# Patient Record
Sex: FEMALE | Race: ASIAN | NOT HISPANIC OR LATINO | URBAN - METROPOLITAN AREA
[De-identification: names, ages, dates, MRNs, and addresses within clinical notes are randomized per-mention and may not be internally consistent; named-entity substitution may affect disease eponyms.]

---

## 2019-05-01 ENCOUNTER — EMERGENCY (EMERGENCY)
Facility: HOSPITAL | Age: 59
LOS: 1 days | Discharge: HOME | End: 2019-05-01
Attending: EMERGENCY MEDICINE | Admitting: EMERGENCY MEDICINE
Payer: MEDICAID

## 2019-05-01 VITALS
TEMPERATURE: 98 F | DIASTOLIC BLOOD PRESSURE: 76 MMHG | HEART RATE: 99 BPM | RESPIRATION RATE: 18 BRPM | SYSTOLIC BLOOD PRESSURE: 127 MMHG | OXYGEN SATURATION: 100 %

## 2019-05-01 VITALS
HEART RATE: 86 BPM | DIASTOLIC BLOOD PRESSURE: 88 MMHG | TEMPERATURE: 98 F | OXYGEN SATURATION: 100 % | SYSTOLIC BLOOD PRESSURE: 124 MMHG | RESPIRATION RATE: 18 BRPM

## 2019-05-01 LAB
ALBUMIN SERPL ELPH-MCNC: 4.1 G/DL — SIGNIFICANT CHANGE UP (ref 3.3–5)
ALP SERPL-CCNC: 76 U/L — SIGNIFICANT CHANGE UP (ref 40–120)
ALT FLD-CCNC: 18 U/L — SIGNIFICANT CHANGE UP (ref 4–33)
ANION GAP SERPL CALC-SCNC: 14 MMO/L — SIGNIFICANT CHANGE UP (ref 7–14)
AST SERPL-CCNC: 18 U/L — SIGNIFICANT CHANGE UP (ref 4–32)
BASOPHILS # BLD AUTO: 0.02 K/UL — SIGNIFICANT CHANGE UP (ref 0–0.2)
BASOPHILS NFR BLD AUTO: 0.4 % — SIGNIFICANT CHANGE UP (ref 0–2)
BILIRUB SERPL-MCNC: < 0.2 MG/DL — LOW (ref 0.2–1.2)
BUN SERPL-MCNC: 10 MG/DL — SIGNIFICANT CHANGE UP (ref 7–23)
CALCIUM SERPL-MCNC: 9.5 MG/DL — SIGNIFICANT CHANGE UP (ref 8.4–10.5)
CHLORIDE SERPL-SCNC: 107 MMOL/L — SIGNIFICANT CHANGE UP (ref 98–107)
CO2 SERPL-SCNC: 24 MMOL/L — SIGNIFICANT CHANGE UP (ref 22–31)
CREAT SERPL-MCNC: 0.63 MG/DL — SIGNIFICANT CHANGE UP (ref 0.5–1.3)
EOSINOPHIL # BLD AUTO: 0.13 K/UL — SIGNIFICANT CHANGE UP (ref 0–0.5)
EOSINOPHIL NFR BLD AUTO: 2.6 % — SIGNIFICANT CHANGE UP (ref 0–6)
GIANT PLATELETS BLD QL SMEAR: PRESENT — SIGNIFICANT CHANGE UP
GLUCOSE SERPL-MCNC: 118 MG/DL — HIGH (ref 70–99)
HCT VFR BLD CALC: 39 % — SIGNIFICANT CHANGE UP (ref 34.5–45)
HGB BLD-MCNC: 12.3 G/DL — SIGNIFICANT CHANGE UP (ref 11.5–15.5)
IMM GRANULOCYTES NFR BLD AUTO: 0.4 % — SIGNIFICANT CHANGE UP (ref 0–1.5)
LIDOCAIN IGE QN: 48.1 U/L — SIGNIFICANT CHANGE UP (ref 7–60)
LYMPHOCYTES # BLD AUTO: 2.73 K/UL — SIGNIFICANT CHANGE UP (ref 1–3.3)
LYMPHOCYTES # BLD AUTO: 55.5 % — HIGH (ref 13–44)
MANUAL SMEAR VERIFICATION: SIGNIFICANT CHANGE UP
MCHC RBC-ENTMCNC: 27.3 PG — SIGNIFICANT CHANGE UP (ref 27–34)
MCHC RBC-ENTMCNC: 31.5 % — LOW (ref 32–36)
MCV RBC AUTO: 86.5 FL — SIGNIFICANT CHANGE UP (ref 80–100)
MONOCYTES # BLD AUTO: 0.24 K/UL — SIGNIFICANT CHANGE UP (ref 0–0.9)
MONOCYTES NFR BLD AUTO: 4.9 % — SIGNIFICANT CHANGE UP (ref 2–14)
NEUTROPHILS # BLD AUTO: 1.78 K/UL — LOW (ref 1.8–7.4)
NEUTROPHILS NFR BLD AUTO: 36.2 % — LOW (ref 43–77)
NRBC # FLD: 0 K/UL — SIGNIFICANT CHANGE UP (ref 0–0)
PLATELET # BLD AUTO: 92 K/UL — LOW (ref 150–400)
PLATELET COUNT - ESTIMATE: SIGNIFICANT CHANGE UP
PMV BLD: 12.3 FL — SIGNIFICANT CHANGE UP (ref 7–13)
POTASSIUM SERPL-MCNC: 4 MMOL/L — SIGNIFICANT CHANGE UP (ref 3.5–5.3)
POTASSIUM SERPL-SCNC: 4 MMOL/L — SIGNIFICANT CHANGE UP (ref 3.5–5.3)
PROT SERPL-MCNC: 7.4 G/DL — SIGNIFICANT CHANGE UP (ref 6–8.3)
RBC # BLD: 4.51 M/UL — SIGNIFICANT CHANGE UP (ref 3.8–5.2)
RBC # FLD: 17.2 % — HIGH (ref 10.3–14.5)
SODIUM SERPL-SCNC: 145 MMOL/L — SIGNIFICANT CHANGE UP (ref 135–145)
WBC # BLD: 4.92 K/UL — SIGNIFICANT CHANGE UP (ref 3.8–10.5)
WBC # FLD AUTO: 4.92 K/UL — SIGNIFICANT CHANGE UP (ref 3.8–10.5)

## 2019-05-01 PROCEDURE — 74177 CT ABD & PELVIS W/CONTRAST: CPT | Mod: 26

## 2019-05-01 PROCEDURE — 99284 EMERGENCY DEPT VISIT MOD MDM: CPT

## 2019-05-01 RX ORDER — SODIUM CHLORIDE 9 MG/ML
1000 INJECTION INTRAMUSCULAR; INTRAVENOUS; SUBCUTANEOUS ONCE
Qty: 0 | Refills: 0 | Status: COMPLETED | OUTPATIENT
Start: 2019-05-01 | End: 2019-05-01

## 2019-05-01 RX ADMIN — SODIUM CHLORIDE 1000 MILLILITER(S): 9 INJECTION INTRAMUSCULAR; INTRAVENOUS; SUBCUTANEOUS at 12:28

## 2019-05-01 NOTE — ED PROVIDER NOTE - OBJECTIVE STATEMENT
Patient had an out patient X-ray lumbar several days ago for back pain and was found to have dilated bowels; patient complained of mild left sided abdominal pain yesterday; no n/v/d, patient denies constipation, last bowel mvmt today; patient also complains of left sided neck pain x 11 days radiating to shoulder and back

## 2019-05-01 NOTE — ED ADULT NURSE NOTE - OBJECTIVE STATEMENT
pt reports approx 2 weeks of Left flank LUQ abd pain radiating to upper back with associated constipation. pt denies urinary symptoms denies vaginal symptoms but does report constipation. abd soft non tender. IV 22 g L forearm labs sent TQ removed NS infusing.

## 2019-05-01 NOTE — ED PROVIDER NOTE - CARE PLAN
Principal Discharge DX:	Cervical strain, acute, initial encounter  Secondary Diagnosis:	Abdominal pain of unknown cause

## 2019-05-01 NOTE — ED ADULT TRIAGE NOTE - CHIEF COMPLAINT QUOTE
Patient PMH SLE reports neck, back and abd pain for 11 days. Patient went to PMH office and had XR performed showing dilation in sigmoid colon and transverse colon. MD script recommends abdominal XR and CT of Abd/Pelvis for further evaluation. Patient currently denies any N/V, last BM today - reports was "a little bit hard" but normal amount.

## 2022-02-15 NOTE — ED ADULT TRIAGE NOTE - PAIN RATING/NUMBER SCALE (0-10): REST
well developed, well nourished , in no acute distress , ambulating without difficulty , normal communication ability
8

## 2023-02-14 NOTE — ED PROVIDER NOTE - NO SIGNIFICANT PAST SURGICAL HISTORY
-- DO NOT REPLY / DO NOT REPLY ALL --  -- Message is from Engagement Center Operations (ECO) --    General Patient Message:     The patients father is calling in regard to the patient needing the referral and order for Physical Therapy authorized and then faxed to [facility] at [phone] please call the patients father back in order to confirm the referral and order has been sent to [facility].    Caller Information       Type Contact Phone/Fax    02/14/2023 03:21 PM CST Phone (Incoming) Praneeth Andujar (Father) [phone]    02/14/2023 03:27 PM CST Phone (Incoming) KELLI ALANIS (Mother) [phone] (M)        Alternative phone number: None    Can a detailed message be left? Yes    Message Turnaround:     Is it Working Hours? Yes - Working Hours     [place]:    Please give this turnaround time to the caller:   \"This message will be sent to [state Provider's name]. The clinical team will fulfill your request as soon as they review your message.\"                
Pls place note and sign off so father can schedule pt . Thx  
Spoke with father. Dr to sign note and father can call back tomorrow to schedule. Father verbalizes understanding  
<<----- Click to add NO significant Past Surgical History

## 2024-06-03 ENCOUNTER — INPATIENT (INPATIENT)
Facility: HOSPITAL | Age: 64
LOS: 3 days | Discharge: HOME HEALTH | DRG: 690 | End: 2024-06-07
Attending: INTERNAL MEDICINE | Admitting: INTERNAL MEDICINE
Payer: COMMERCIAL

## 2024-06-03 VITALS
HEART RATE: 85 BPM | DIASTOLIC BLOOD PRESSURE: 64 MMHG | SYSTOLIC BLOOD PRESSURE: 120 MMHG | RESPIRATION RATE: 13 BRPM | TEMPERATURE: 99 F | OXYGEN SATURATION: 97 %

## 2024-06-03 DIAGNOSIS — N39.0 URINARY TRACT INFECTION, SITE NOT SPECIFIED: ICD-10-CM

## 2024-06-03 PROBLEM — M32.9 SYSTEMIC LUPUS ERYTHEMATOSUS, UNSPECIFIED: Chronic | Status: ACTIVE | Noted: 2019-05-01

## 2024-06-03 LAB
ALBUMIN SERPL ELPH-MCNC: 4 G/DL — SIGNIFICANT CHANGE UP (ref 3.3–5)
ALP SERPL-CCNC: 89 U/L — SIGNIFICANT CHANGE UP (ref 40–120)
ALT FLD-CCNC: 9 U/L — LOW (ref 10–45)
ANION GAP SERPL CALC-SCNC: 13 MMOL/L — SIGNIFICANT CHANGE UP (ref 5–17)
APPEARANCE UR: CLEAR — SIGNIFICANT CHANGE UP
AST SERPL-CCNC: 14 U/L — SIGNIFICANT CHANGE UP (ref 10–40)
BACTERIA # UR AUTO: NEGATIVE /HPF — SIGNIFICANT CHANGE UP
BASOPHILS # BLD AUTO: 0 K/UL — SIGNIFICANT CHANGE UP (ref 0–0.2)
BASOPHILS NFR BLD AUTO: 0 % — SIGNIFICANT CHANGE UP (ref 0–2)
BILIRUB SERPL-MCNC: 0.2 MG/DL — SIGNIFICANT CHANGE UP (ref 0.2–1.2)
BILIRUB UR-MCNC: NEGATIVE — SIGNIFICANT CHANGE UP
BUN SERPL-MCNC: 8 MG/DL — SIGNIFICANT CHANGE UP (ref 7–23)
CALCIUM SERPL-MCNC: 9.4 MG/DL — SIGNIFICANT CHANGE UP (ref 8.4–10.5)
CAST: 0 /LPF — SIGNIFICANT CHANGE UP (ref 0–4)
CHLORIDE SERPL-SCNC: 107 MMOL/L — SIGNIFICANT CHANGE UP (ref 96–108)
CO2 SERPL-SCNC: 22 MMOL/L — SIGNIFICANT CHANGE UP (ref 22–31)
COLOR SPEC: YELLOW — SIGNIFICANT CHANGE UP
CREAT SERPL-MCNC: 0.63 MG/DL — SIGNIFICANT CHANGE UP (ref 0.5–1.3)
DIFF PNL FLD: NEGATIVE — SIGNIFICANT CHANGE UP
EGFR: 98 ML/MIN/1.73M2 — SIGNIFICANT CHANGE UP
EOSINOPHIL # BLD AUTO: 0.13 K/UL — SIGNIFICANT CHANGE UP (ref 0–0.5)
EOSINOPHIL NFR BLD AUTO: 4.5 % — SIGNIFICANT CHANGE UP (ref 0–6)
GIANT PLATELETS BLD QL SMEAR: PRESENT — SIGNIFICANT CHANGE UP
GLUCOSE SERPL-MCNC: 105 MG/DL — HIGH (ref 70–99)
GLUCOSE UR QL: NEGATIVE MG/DL — SIGNIFICANT CHANGE UP
HCT VFR BLD CALC: 36.1 % — SIGNIFICANT CHANGE UP (ref 34.5–45)
HGB BLD-MCNC: 11.9 G/DL — SIGNIFICANT CHANGE UP (ref 11.5–15.5)
KETONES UR-MCNC: NEGATIVE MG/DL — SIGNIFICANT CHANGE UP
LEUKOCYTE ESTERASE UR-ACNC: ABNORMAL
LYMPHOCYTES # BLD AUTO: 1.6 K/UL — SIGNIFICANT CHANGE UP (ref 1–3.3)
LYMPHOCYTES # BLD AUTO: 53.6 % — HIGH (ref 13–44)
MANUAL SMEAR VERIFICATION: SIGNIFICANT CHANGE UP
MCHC RBC-ENTMCNC: 29.5 PG — SIGNIFICANT CHANGE UP (ref 27–34)
MCHC RBC-ENTMCNC: 33 GM/DL — SIGNIFICANT CHANGE UP (ref 32–36)
MCV RBC AUTO: 89.4 FL — SIGNIFICANT CHANGE UP (ref 80–100)
MONOCYTES # BLD AUTO: 0.18 K/UL — SIGNIFICANT CHANGE UP (ref 0–0.9)
MONOCYTES NFR BLD AUTO: 6.2 % — SIGNIFICANT CHANGE UP (ref 2–14)
NEUTROPHILS # BLD AUTO: 1.04 K/UL — LOW (ref 1.8–7.4)
NEUTROPHILS NFR BLD AUTO: 33.9 % — LOW (ref 43–77)
NEUTS BAND # BLD: 0.9 % — SIGNIFICANT CHANGE UP (ref 0–8)
NITRITE UR-MCNC: NEGATIVE — SIGNIFICANT CHANGE UP
PH UR: 6.5 — SIGNIFICANT CHANGE UP (ref 5–8)
PLAT MORPH BLD: NORMAL — SIGNIFICANT CHANGE UP
PLATELET # BLD AUTO: 81 K/UL — LOW (ref 150–400)
POTASSIUM SERPL-MCNC: 4.1 MMOL/L — SIGNIFICANT CHANGE UP (ref 3.5–5.3)
POTASSIUM SERPL-SCNC: 4.1 MMOL/L — SIGNIFICANT CHANGE UP (ref 3.5–5.3)
PROT SERPL-MCNC: 7.3 G/DL — SIGNIFICANT CHANGE UP (ref 6–8.3)
PROT UR-MCNC: NEGATIVE MG/DL — SIGNIFICANT CHANGE UP
RBC # BLD: 4.04 M/UL — SIGNIFICANT CHANGE UP (ref 3.8–5.2)
RBC # FLD: 13.2 % — SIGNIFICANT CHANGE UP (ref 10.3–14.5)
RBC BLD AUTO: NORMAL — SIGNIFICANT CHANGE UP
RBC CASTS # UR COMP ASSIST: 1 /HPF — SIGNIFICANT CHANGE UP (ref 0–4)
SODIUM SERPL-SCNC: 142 MMOL/L — SIGNIFICANT CHANGE UP (ref 135–145)
SP GR SPEC: >1.03 — HIGH (ref 1–1.03)
SQUAMOUS # UR AUTO: 1 /HPF — SIGNIFICANT CHANGE UP (ref 0–5)
UROBILINOGEN FLD QL: 0.2 MG/DL — SIGNIFICANT CHANGE UP (ref 0.2–1)
VARIANT LYMPHS # BLD: 0.9 % — SIGNIFICANT CHANGE UP (ref 0–6)
WBC # BLD: 2.98 K/UL — LOW (ref 3.8–10.5)
WBC # FLD AUTO: 2.98 K/UL — LOW (ref 3.8–10.5)
WBC UR QL: 27 /HPF — HIGH (ref 0–5)

## 2024-06-03 PROCEDURE — 99285 EMERGENCY DEPT VISIT HI MDM: CPT

## 2024-06-03 PROCEDURE — 74177 CT ABD & PELVIS W/CONTRAST: CPT | Mod: 26,MC

## 2024-06-03 PROCEDURE — 73564 X-RAY EXAM KNEE 4 OR MORE: CPT | Mod: 26,LT,RT

## 2024-06-03 RX ORDER — SULFASALAZINE 500 MG
1 TABLET ORAL
Refills: 0 | DISCHARGE

## 2024-06-03 RX ORDER — HYDROXYCHLOROQUINE SULFATE 200 MG
1 TABLET ORAL
Refills: 0 | DISCHARGE

## 2024-06-03 RX ORDER — CEFTRIAXONE 500 MG/1
1000 INJECTION, POWDER, FOR SOLUTION INTRAMUSCULAR; INTRAVENOUS ONCE
Refills: 0 | Status: COMPLETED | OUTPATIENT
Start: 2024-06-03 | End: 2024-06-03

## 2024-06-03 RX ORDER — KETOROLAC TROMETHAMINE 30 MG/ML
15 SYRINGE (ML) INJECTION ONCE
Refills: 0 | Status: DISCONTINUED | OUTPATIENT
Start: 2024-06-03 | End: 2024-06-03

## 2024-06-03 RX ORDER — HYDROXYCHLOROQUINE SULFATE 200 MG
200 TABLET ORAL
Refills: 0 | Status: DISCONTINUED | OUTPATIENT
Start: 2024-06-03 | End: 2024-06-07

## 2024-06-03 RX ORDER — UPADACITINIB 45 MG/1
1 TABLET, EXTENDED RELEASE ORAL
Refills: 0 | DISCHARGE

## 2024-06-03 RX ORDER — SULFASALAZINE 500 MG
500 TABLET ORAL
Refills: 0 | Status: DISCONTINUED | OUTPATIENT
Start: 2024-06-03 | End: 2024-06-07

## 2024-06-03 RX ORDER — SODIUM CHLORIDE 9 MG/ML
1000 INJECTION INTRAMUSCULAR; INTRAVENOUS; SUBCUTANEOUS ONCE
Refills: 0 | Status: COMPLETED | OUTPATIENT
Start: 2024-06-03 | End: 2024-06-03

## 2024-06-03 RX ORDER — ENOXAPARIN SODIUM 100 MG/ML
40 INJECTION SUBCUTANEOUS EVERY 24 HOURS
Refills: 0 | Status: DISCONTINUED | OUTPATIENT
Start: 2024-06-03 | End: 2024-06-07

## 2024-06-03 RX ORDER — ACETAMINOPHEN 500 MG
650 TABLET ORAL EVERY 6 HOURS
Refills: 0 | Status: DISCONTINUED | OUTPATIENT
Start: 2024-06-03 | End: 2024-06-07

## 2024-06-03 RX ORDER — ACETAMINOPHEN 500 MG
1000 TABLET ORAL ONCE
Refills: 0 | Status: COMPLETED | OUTPATIENT
Start: 2024-06-03 | End: 2024-06-03

## 2024-06-03 RX ADMIN — CEFTRIAXONE 100 MILLIGRAM(S): 500 INJECTION, POWDER, FOR SOLUTION INTRAMUSCULAR; INTRAVENOUS at 15:53

## 2024-06-03 RX ADMIN — SODIUM CHLORIDE 1000 MILLILITER(S): 9 INJECTION INTRAMUSCULAR; INTRAVENOUS; SUBCUTANEOUS at 13:39

## 2024-06-03 RX ADMIN — Medication 400 MILLIGRAM(S): at 12:08

## 2024-06-03 RX ADMIN — Medication 1000 MILLIGRAM(S): at 12:30

## 2024-06-03 RX ADMIN — Medication 15 MILLIGRAM(S): at 18:40

## 2024-06-03 NOTE — ED ADULT NURSE NOTE - NSFALLRISKINTERV_ED_ALL_ED

## 2024-06-03 NOTE — ED ADULT NURSE NOTE - OBJECTIVE STATEMENT
Pt is a 63 y/o female with pmh lupus and arthritis presenting to the ED c/o urinary symtpoms. Pt is primarily Armenian-speaking, her daughter present at bedside requesting to translate. Pt reports persistent burning on urination with increased frequency since 5/12, was treated for suspected UTI with PO abx (unable to recall specific abx taken.) Pt reports course of abx was finished 1 week ago, but s/s are not resolved, pt is also now having hematuria and L flank pain. Pt denies fever, chills, n/v, abdominal pain, chest pain, SOB. Pt is a 63 y/o female with pmh lupus and arthritis presenting to the ED c/o urinary symptoms. Pt is primarily Indonesian-speaking, her daughter present at bedside requesting to translate. Pt reports persistent burning on urination with increased frequency since 5/12, was treated for suspected UTI with PO abx (unable to recall specific abx taken.) Pt reports course of abx was finished 1 week ago, but s/s are not resolved, pt is also now having hematuria and L flank pain. Pt denies fever, chills, n/v, abdominal pain, chest pain, SOB. Pt also reports having increased knee pain and "clicking sounds" b/l worse on the L knee compared to R. Pt denies recent falls/trauma, states it does not feel like her typical arthritis pain.

## 2024-06-03 NOTE — H&P ADULT - NSHPPHYSICALEXAM_GEN_ALL_CORE
T(C): 36.7 (06-03-24 @ 18:45), Max: 37.1 (06-03-24 @ 10:23)  HR: 83 (06-03-24 @ 18:45) (83 - 88)  BP: 127/81 (06-03-24 @ 18:45) (108/63 - 127/81)  RR: 16 (06-03-24 @ 18:45) (13 - 16)  SpO2: 97% (06-03-24 @ 18:45) (97% - 100%)    PHYSICAL EXAM:  GENERAL: NAD, well-developed  HEAD:  Atraumatic, Normocephalic  EYES: EOMI, PERRLA, conjunctiva and sclera clear  NECK: Supple, No JVD  CHEST/LUNG: Clear to auscultation bilaterally; No wheeze  HEART: Regular rate and rhythm; No murmurs, rubs, or gallops  ABDOMEN: Soft, Nontender, Nondistended; Bowel sounds present  EXTREMITIES:  2+ Peripheral Pulses, No clubbing, cyanosis, or edema  PSYCH: AAOx3  NEUROLOGY: non-focal  SKIN: No rashes or lesions

## 2024-06-03 NOTE — ED ADULT NURSE NOTE - CHIEF COMPLAINT QUOTE
returned form [facility] with the left knee pain  she was there for 11 months both knee pain pt has UTI symptoms also finished antibiotics

## 2024-06-03 NOTE — ED PROVIDER NOTE - PHYSICAL EXAMINATION
GEN: NAD, awake, eyes open spontaneously  EYES: normal conjunctiva, perrl  ENT: NCAT, MMM, Trachea midline  CHEST/LUNGS: Non-tachypneic, CTAB, bilateral breath sounds  CARDIAC: Non-tachycardic, normal perfusion  ABDOMEN: Soft, LLQ tenderness to palpation, No rebound/guarding  : + Left CVA tenderness. No right sided CVA tenderness   MSK: Crepitus palpated over left patella with knee flexion. Pain with knee flexion bilaterally. No point tenderness to palpation. No overlying skin changes.   SKIN: No rashes, no petechiae, no vesicles  NEURO: CN grossly intact, normal coordination, no focal motor or sensory deficits  PSYCH: Alert, appropriate, cooperative, with capacity and insight

## 2024-06-03 NOTE — ED ADULT TRIAGE NOTE - CHIEF COMPLAINT QUOTE
returned form [facility] with the left knee pain  she was there for 11 months both knee pain pt has UTI symptoms also finished antibiotics

## 2024-06-03 NOTE — ED PROVIDER NOTE - CLINICAL SUMMARY MEDICAL DECISION MAKING FREE TEXT BOX
64F with hx of lupus presents with persistent dysuria, hematuria, frequency despite course of outpatient abx, now with abdominal and left flank pain. Concern for UTI vs pyelo. Will obtain urine studies as well as eval kidney function. Consider imaging studies if kidney function abnormal. Also here with bilateral knee pain, L>R. Suspect osteoarthritis - will obtain XRs, pain control and reassess. 64F with hx of lupus presents with persistent dysuria, hematuria, frequency despite course of outpatient abx, now with abdominal and left flank pain. Concern for UTI vs pyelo vs kidney stone. Will obtain urine studies, eval kidney function, and CT abd/pel. Also here with bilateral knee pain, L>R. Suspect osteoarthritis - will obtain XRs, pain control and reassess.

## 2024-06-03 NOTE — ED PROVIDER NOTE - PROGRESS NOTE DETAILS
Attending MD Franco.  Pt signed out to me in stable condition pending UA, 63 yo fem from [facility] who recently returned now s/p abxs 1 wk ago with recurrent dysuria/hematuria/planned tx anyway. Milvia Jones,  (PGY-1): Patient still with mild pain. Due to persistent symptoms despite OP therapy, will admit for monitoring and IV abx. Patient and daughter in agreement with plan. Will give Toradol for pain control. Milvia Jones,  (PGY-1): Patient still with mild pain. Due to persistent symptoms despite OP therapy, will admit for monitoring and IV abx. Patient and daughter in agreement with plan. Will give Toradol for pain control..

## 2024-06-03 NOTE — ED PROVIDER NOTE - ATTENDING APP SHARED VISIT CONTRIBUTION OF CARE
RGUJRAL 64-year-old female history of hypertension brought in by her daughter for symptoms of dysuria and hematuria that started on May 12.  Patient was taken to an urgent care and treated with ? name antibiotic that she finished about 1 week ago.  Patient did not feel better after taking the medication and now with persistent symptoms and left flank pain.  Denies any nausea vomiting diarrhea, fevers or chills.  Of note patient also complains of bilateral left greater than right knee pain for 6 months.  Pain is worse with ambulation and intermittent.  Denies any calf pain.  On exam patient is well-appearing no acute distress abdomen is soft mild tender suprapubic.  Left flank mild tender CVAT.  No rash.  Bilateral calfs nontender.  Bilateral knees no swelling mild pain elicited with range of motion.  Neurovascularly intact.  Will obtain labs CT urine to evaluate for pyelonephritis kidney stone.  Pain meds and continue to monitor.

## 2024-06-04 LAB
ADD ON TEST-SPECIMEN IN LAB: SIGNIFICANT CHANGE UP
CRP SERPL-MCNC: <3 MG/L — SIGNIFICANT CHANGE UP (ref 0–4)

## 2024-06-04 PROCEDURE — 72052 X-RAY EXAM NECK SPINE 6/>VWS: CPT | Mod: 26

## 2024-06-04 PROCEDURE — 99223 1ST HOSP IP/OBS HIGH 75: CPT

## 2024-06-04 PROCEDURE — 72100 X-RAY EXAM L-S SPINE 2/3 VWS: CPT | Mod: 26

## 2024-06-04 PROCEDURE — 73120 X-RAY EXAM OF HAND: CPT | Mod: 26,LT,RT

## 2024-06-04 RX ADMIN — Medication 650 MILLIGRAM(S): at 06:20

## 2024-06-04 RX ADMIN — Medication 200 MILLIGRAM(S): at 17:24

## 2024-06-04 RX ADMIN — Medication 650 MILLIGRAM(S): at 05:22

## 2024-06-04 RX ADMIN — Medication 200 MILLIGRAM(S): at 05:08

## 2024-06-04 RX ADMIN — Medication 500 MILLIGRAM(S): at 17:24

## 2024-06-04 RX ADMIN — Medication 500 MILLIGRAM(S): at 05:08

## 2024-06-04 NOTE — PATIENT PROFILE ADULT - FALL HARM RISK - HARM RISK INTERVENTIONS

## 2024-06-04 NOTE — CONSULT NOTE ADULT - SUBJECTIVE AND OBJECTIVE BOX
***consult has been received. note is in progress and incomplete without attending attestation***    Hubert Bernstein MD, PGY-5  Rheumatology Fellow  Reachable on TEAMS    NOÉDARIUS VALENCIA  [id number]    HPI:  64 year old Maori speaking female with hx of Lupus presents to the ED complaining of persistent dysuria, hematuria, urinary frequency x 3 weeks. She was evaluated on 5/12/24 for her symptoms and completed a 12 day course of antibiotics (unable to remember name). She states her symptoms did not improve with the antibiotics and she is now complaining of lower abdominal pain and left flank pain. Denies kidney issues in the past, hx of UTIs, or kidney stones. She denies nausea, vomiting, fever, chills. She is also endorsing bilateral knee pain for the past few months, L>R, with crepitus. She ambulates with a cane at baseline. No pain medications at home. No asymmetric swelling or redness. No trauma. (03 Jun 2024 21:40)    Rheumatology consulted to evaluate for SLE flare    Rheumatologist: Lauren Moss?      Rheum ROS    Denies fevers/chills/weight loss/night sweats/alopecia/sinus disease/asthma history/oral ulcers/dysphagia/vision changes/Raynauds/VTE/miscarraiges/sicca symptoms/urinary changes/edema/SOB/joint pain/swelling/jaw claudication/rash/photosensitivity/morning stiffness    Endorses fevers/chills/weight loss/night sweats/alopecia/sinus disease/asthma history/oral ulcers/dysphagia/vision changes/Raynauds/VTE/miscarraiges/sicca symptoms/urinary changes/edema/SOB/joint pain/swelling/jaw claudication/rash/photosensitivity/morning stiffness      MEDICATIONS  (STANDING):  enoxaparin Injectable 40 milliGRAM(s) SubCutaneous every 24 hours  hydroxychloroquine 200 milliGRAM(s) Oral two times a day  sulfaSALAzine 500 milliGRAM(s) Oral two times a day    MEDICATIONS  (PRN):  acetaminophen     Tablet .. 650 milliGRAM(s) Oral every 6 hours PRN Temp greater or equal to 38C (100.4F), Mild Pain (1 - 3)      Allergies    No Known Allergies    Intolerances        PERTINENT MEDICATION HISTORY:    SOCIAL HISTORY:  OCCUPATION:  TRAVEL HISTORY:    FAMILY HISTORY:  No pertinent family history in first degree relatives        Vital Signs Last 24 Hrs  T(C): 36.8 (04 Jun 2024 12:51), Max: 37 (03 Jun 2024 14:40)  T(F): 98.3 (04 Jun 2024 12:51), Max: 98.6 (03 Jun 2024 14:40)  HR: 69 (04 Jun 2024 12:51) (69 - 88)  BP: 121/81 (04 Jun 2024 12:51) (104/68 - 127/81)  BP(mean): --  RR: 18 (04 Jun 2024 12:51) (15 - 18)  SpO2: 97% (04 Jun 2024 12:51) (97% - 100%)    Parameters below as of 04 Jun 2024 12:51  Patient On (Oxygen Delivery Method): room air        Physical Exam:  General: No apparent distress  HEENT: EOMI, MMM  CVS: +S1/S2, RRR, no murmurs/rubs/gallops  Resp: CTA b/l. No crackles/wheezing  GI: Soft, NT/ND +BS  MSK: no swelling/warmth/erythema of the joints of the UE/LE  Neuro: AAOx3  Skin: no visible rashes    LABS:                        11.9   2.98  )-----------( 81       ( 03 Jun 2024 12:18 )             36.1     06-03    142  |  107  |  8   ----------------------------<  105<H>  4.1   |  22  |  0.63    Ca    9.4      03 Jun 2024 12:18    TPro  7.3  /  Alb  4.0  /  TBili  0.2  /  DBili  x   /  AST  14  /  ALT  9<L>  /  AlkPhos  89  06-03      Urinalysis Basic - ( 03 Jun 2024 14:59 )    Color: Yellow / Appearance: Clear / SG: >1.030 / pH: x  Gluc: x / Ketone: Negative mg/dL  / Bili: Negative / Urobili: 0.2 mg/dL   Blood: x / Protein: Negative mg/dL / Nitrite: Negative   Leuk Esterase: Small / RBC: 1 /HPF / WBC 27 /HPF   Sq Epi: x / Non Sq Epi: 1 /HPF / Bacteria: Negative /HPF        Rheumatology Work Up    C-Reactive Protein: <3 mg/L [0 - 4] (06-04-24 @ 11:37)    Urinalysis + Microscopic Examination (06.03.24 @ 14:59)    pH Urine: 6.5   Urine Appearance: Clear   Color: Yellow   Specific Gravity: >1.030   Protein, Urine: Negative mg/dL   Glucose Qualitative, Urine: Negative mg/dL   Ketone - Urine: Negative mg/dL   Blood, Urine: Negative   Bilirubin: Negative   Urobilinogen: 0.2 mg/dL   Leukocyte Esterase Concentration: Small   Nitrite: Negative   White Blood Cell - Urine: 27 /HPF   Red Blood Cell - Urine: 1 /HPF   Bacteria: Negative /HPF   Cast: 0 /LPF   Epithelial Cells: 1 /HPF        RADIOLOGY & ADDITIONAL STUDIES:  < from: CT Abdomen and Pelvis w/ IV Cont (06.03.24 @ 13:08) >    ACC: [id number] EXAM:  CT ABDOMEN AND PELVIS IC   ORDERED BY:  JOSE RAFAEL QUINTANILLA     PROCEDURE DATE:  06/03/2024          INTERPRETATION:  CLINICAL INFORMATION: uti, flank pain MCT    COMPARISON: None.    CONTRAST/COMPLICATIONS:  IV Contrast: Omnipaque 350  90 cc administered   10 cc discarded  Oral Contrast: NONE  Complications: None reported at time of study completion    PROCEDURE:  CT of the Abdomen and Pelvis was performed.  Sagittal and coronal reformats were performed.    FINDINGS:    LOWER CHEST: Within normal limits.    LIVER: Within normal limits.  BILE DUCTS: Normal caliber.  GALLBLADDER: Within normal limits.  SPLEEN: Enlarged.  PANCREAS: Within normal limits.  ADRENALS: Within normal limits.  KIDNEYS/URETERS: A 1 cm right upper pole angiomyolipoma. Small left upper   pole cysts.    BLADDER: Within normal limits.  REPRODUCTIVE ORGANS: Within normal limits.    BOWEL: No bowel obstruction. The appendix is normal.  PERITONEUM: No ascites.  VESSELS:  Within normal limits.  RETROPERITONEUM/LYMPH NODES: No lymphadenopathy.  ABDOMINAL WALL: Within normal limits.  BONES: Within normal limits.    IMPRESSION: No urinary calculus or obstructive uropathy.    Mild splenomegaly.    --- End of Report ---    < end of copied text >     Hubert Bernstein MD, PGY-5  Rheumatology Fellow  Reachable on TEAMS    WILLA SERNARICK  [id number]    HPI:  64 year old Slovenian speaking female with hx of Lupus presents to the ED complaining of persistent dysuria, hematuria, urinary frequency x 3 weeks. She was evaluated on 5/12/24 for her symptoms and completed a 12 day course of antibiotics (unable to remember name). She states her symptoms did not improve with the antibiotics and she is now complaining of lower abdominal pain and left flank pain. Denies kidney issues in the past, hx of UTIs, or kidney stones. She denies nausea, vomiting, fever, chills. She is also endorsing bilateral knee pain for the past few months, L>R, with crepitus. She ambulates with a cane at baseline. No pain medications at home. No asymmetric swelling or redness. No trauma. (03 Jun 2024 21:40)    Rheumatology consulted to evaluate for SLE flare    Rheumatologist: Lauren Moss  History gathered with assistance from daughter at bedside. Patient presenting for progressive b/l flank pain x3 weeks. Pain is constant with no association with food or urination. Has had courses of antibiotics without relief of pain. Patient came to ED and found to have UTI    Autoimmune history  Sees Dr. Lauren Moss  Diagnosed with SLE/RA approximately 10 years ago  Had symptoms of fever/arthralgias as well as neutropenia and thrombocytopenia  Has been on HCQ/SSZ (for many years) as well as on/off Rinvoq (2-3 years) and occasional steroids  Reports having bone marrow biopsy from outside Hematologist 2 years ago. Was told neutropenia was due to immunosuppression  Otherwise goes back and fourth to [facility]. Last blood work in [facility] from 4/2024 with negative dsdna/RF/ccp/C3/C4      Rheum ROS    Denies fevers/chills/weight loss/night sweats/alopecia/sinus disease/asthma history/oral ulcers/dysphagia/vision changes/Raynauds/VTE/urinary changes/SOB/rash/photosensitivity/morning stiffness    Endorses miscarraige x2 (2-3 months) /sicca symptoms/LE edema/joint pain      MEDICATIONS  (STANDING):  enoxaparin Injectable 40 milliGRAM(s) SubCutaneous every 24 hours  hydroxychloroquine 200 milliGRAM(s) Oral two times a day  sulfaSALAzine 500 milliGRAM(s) Oral two times a day    MEDICATIONS  (PRN):  acetaminophen     Tablet .. 650 milliGRAM(s) Oral every 6 hours PRN Temp greater or equal to 38C (100.4F), Mild Pain (1 - 3)      Allergies    No Known Allergies    Intolerances      PERTINENT MEDICATION HISTORY:    SOCIAL HISTORY: denies smoking/EtOH/drug use  OCCUPATION: not working  TRAVEL HISTORY: from [facility], comes back and fourth to the     FAMILY HISTORY:  No pertinent family history in first degree relatives      Vital Signs Last 24 Hrs  T(C): 36.8 (04 Jun 2024 12:51), Max: 37 (03 Jun 2024 14:40)  T(F): 98.3 (04 Jun 2024 12:51), Max: 98.6 (03 Jun 2024 14:40)  HR: 69 (04 Jun 2024 12:51) (69 - 88)  BP: 121/81 (04 Jun 2024 12:51) (104/68 - 127/81)  BP(mean): --  RR: 18 (04 Jun 2024 12:51) (15 - 18)  SpO2: 97% (04 Jun 2024 12:51) (97% - 100%)    Parameters below as of 04 Jun 2024 12:51  Patient On (Oxygen Delivery Method): room air        Physical Exam:  General: No apparent distress  HEENT: EOMI, MMM, no oral ulcers  CVS: +S1/S2, RRR, no murmurs/rubs/gallops  Resp: CTA b/l. No crackles/wheezing  MSK: no swelling/warmth/erythema of the joints of the UE/LE. radicular pain associated with ROM of the neck. pain with ROM of b/l shoulders however pain originating from neck. + SLR and PATRICIA in LLE  Neuro: AAOx3  Skin: no visible rashes    LABS:                        11.9   2.98  )-----------( 81       ( 03 Jun 2024 12:18 )             36.1     06-03    142  |  107  |  8   ----------------------------<  105<H>  4.1   |  22  |  0.63    Ca    9.4      03 Jun 2024 12:18    TPro  7.3  /  Alb  4.0  /  TBili  0.2  /  DBili  x   /  AST  14  /  ALT  9<L>  /  AlkPhos  89  06-03      Urinalysis Basic - ( 03 Jun 2024 14:59 )    Color: Yellow / Appearance: Clear / SG: >1.030 / pH: x  Gluc: x / Ketone: Negative mg/dL  / Bili: Negative / Urobili: 0.2 mg/dL   Blood: x / Protein: Negative mg/dL / Nitrite: Negative   Leuk Esterase: Small / RBC: 1 /HPF / WBC 27 /HPF   Sq Epi: x / Non Sq Epi: 1 /HPF / Bacteria: Negative /HPF        Rheumatology Work Up    C-Reactive Protein: <3 mg/L [0 - 4] (06-04-24 @ 11:37)    Urinalysis + Microscopic Examination (06.03.24 @ 14:59)    pH Urine: 6.5   Urine Appearance: Clear   Color: Yellow   Specific Gravity: >1.030   Protein, Urine: Negative mg/dL   Glucose Qualitative, Urine: Negative mg/dL   Ketone - Urine: Negative mg/dL   Blood, Urine: Negative   Bilirubin: Negative   Urobilinogen: 0.2 mg/dL   Leukocyte Esterase Concentration: Small   Nitrite: Negative   White Blood Cell - Urine: 27 /HPF   Red Blood Cell - Urine: 1 /HPF   Bacteria: Negative /HPF   Cast: 0 /LPF   Epithelial Cells: 1 /HPF    Culture Results:   >100,000 CFU/ml Escherichia coli (06.03.24 @ 14:59)      RADIOLOGY & ADDITIONAL STUDIES:  < from: CT Abdomen and Pelvis w/ IV Cont (06.03.24 @ 13:08) >    ACC: [id number] EXAM:  CT ABDOMEN AND PELVIS IC   ORDERED BY:  JOSE RAFAEL QUINTANILLA     PROCEDURE DATE:  06/03/2024          INTERPRETATION:  CLINICAL INFORMATION: uti, flank pain MCT    COMPARISON: None.    CONTRAST/COMPLICATIONS:  IV Contrast: Omnipaque 350  90 cc administered   10 cc discarded  Oral Contrast: NONE  Complications: None reported at time of study completion    PROCEDURE:  CT of the Abdomen and Pelvis was performed.  Sagittal and coronal reformats were performed.    FINDINGS:    LOWER CHEST: Within normal limits.    LIVER: Within normal limits.  BILE DUCTS: Normal caliber.  GALLBLADDER: Within normal limits.  SPLEEN: Enlarged.  PANCREAS: Within normal limits.  ADRENALS: Within normal limits.  KIDNEYS/URETERS: A 1 cm right upper pole angiomyolipoma. Small left upper   pole cysts.    BLADDER: Within normal limits.  REPRODUCTIVE ORGANS: Within normal limits.    BOWEL: No bowel obstruction. The appendix is normal.  PERITONEUM: No ascites.  VESSELS:  Within normal limits.  RETROPERITONEUM/LYMPH NODES: No lymphadenopathy.  ABDOMINAL WALL: Within normal limits.  BONES: Within normal limits.    IMPRESSION: No urinary calculus or obstructive uropathy.    Mild splenomegaly.    --- End of Report ---    < end of copied text >

## 2024-06-04 NOTE — CONSULT NOTE ADULT - ASSESSMENT
64 year old Japanese speaking female with hx of Lupus presents to the ED complaining of persistent dysuria, hematuria, urinary frequency x 3 weeks.     ##history of SLE?    ##neutropenia and thrombocytopenia 64 year old Korean speaking female with hx of Lupus presents to the ED complaining of persistent dysuria, hematuria, urinary frequency x 3 weeks.     ##history of SLE/RA  -follows with Dr. Lauren Moss  -takes HCQ/SSZ and Rinvoq  -given UTI, hold Rinvoq. can continue HCQ/SSZ  -last serologies from [facility] in 4/2024 with normal dsDNA/C3/C4/RF/CCP  -will check serologies here  -will assess hand xray to assess for stigmata of RA as none was seen on examination today    ##neutropenia and thrombocytopenia  -reports bone marrow biopsy 2 years ago negative for malignancy  -was told that was result of immunosuppression  -suspect component of SLE causing the bicytopenia  -also with mild splenomegaly ?LGL vs Felty syndrome if confirmed to have RA  -continue to monitor    ##arthralgia  -suspect component of OA  -neck pain and b/l UE pain likely due to cervical radiculopathy  -however given possible history of RA will assess for a-a instability  -if instability present would need neurosurgery input  -will check back xray for lumbar radiculopathy evaluation  -can trial gabapentin 300mg qHS to see if pain improves  -no active synovitis seen on exam, doubt inflammatory arthritis component    PLAN  -hold Rinvoq in setting of UTI. can continue SSZ and HCQ  -no need for further immunosuppression at this time  -f/u xrays  -no need to hold patient to follow up serologies sent today  -supportive pain measures, can trial gabapentin for neuropathic pain 300mg qHs for now    case d/w Dr. Tejada    the patient has emergency medicaid, will check if the patient can follow up at the [facility] fellows clinic located in [address] [place] [address] upon discharge as per patient preference; patient also has existing Rheumatologist Dr. Lauren Bernstein MD, PGY-5  Rheumatology Fellow  Reachable on TEAMS

## 2024-06-05 ENCOUNTER — TRANSCRIPTION ENCOUNTER (OUTPATIENT)
Age: 64
End: 2024-06-05

## 2024-06-05 LAB
ANTI-RIBONUCLEAR PROTEIN: <0.2 AI — SIGNIFICANT CHANGE UP
C3 SERPL-MCNC: 124 MG/DL — SIGNIFICANT CHANGE UP (ref 81–157)
C4 SERPL-MCNC: 15 MG/DL — SIGNIFICANT CHANGE UP (ref 13–39)
DSDNA AB FLD-ACNC: <0.2 AI — SIGNIFICANT CHANGE UP
DSDNA AB SER-ACNC: <1 IU/ML — SIGNIFICANT CHANGE UP
ENA SM AB FLD QL: <0.2 AI — SIGNIFICANT CHANGE UP
ENA SS-A AB FLD IA-ACNC: <0.2 AI — SIGNIFICANT CHANGE UP
ERYTHROCYTE [SEDIMENTATION RATE] IN BLOOD: 25 MM/HR — HIGH (ref 0–20)
RHEUMATOID FACT SERPL-ACNC: <10 IU/ML — SIGNIFICANT CHANGE UP (ref 0–13)

## 2024-06-05 PROCEDURE — 99222 1ST HOSP IP/OBS MODERATE 55: CPT

## 2024-06-05 RX ORDER — GABAPENTIN 400 MG/1
1 CAPSULE ORAL
Qty: 30 | Refills: 0
Start: 2024-06-05 | End: 2024-07-04

## 2024-06-05 RX ORDER — ERTAPENEM SODIUM 1 G/1
1000 INJECTION, POWDER, LYOPHILIZED, FOR SOLUTION INTRAMUSCULAR; INTRAVENOUS EVERY 24 HOURS
Refills: 0 | Status: COMPLETED | OUTPATIENT
Start: 2024-06-05 | End: 2024-06-07

## 2024-06-05 RX ORDER — ACETAMINOPHEN 500 MG
2 TABLET ORAL
Refills: 0 | DISCHARGE

## 2024-06-05 RX ORDER — ACETAMINOPHEN 500 MG
2 TABLET ORAL
Qty: 0 | Refills: 0 | DISCHARGE
Start: 2024-06-05

## 2024-06-05 RX ORDER — GABAPENTIN 400 MG/1
300 CAPSULE ORAL AT BEDTIME
Refills: 0 | Status: DISCONTINUED | OUTPATIENT
Start: 2024-06-05 | End: 2024-06-07

## 2024-06-05 RX ADMIN — GABAPENTIN 300 MILLIGRAM(S): 400 CAPSULE ORAL at 21:11

## 2024-06-05 RX ADMIN — Medication 200 MILLIGRAM(S): at 16:37

## 2024-06-05 RX ADMIN — Medication 500 MILLIGRAM(S): at 16:37

## 2024-06-05 RX ADMIN — Medication 650 MILLIGRAM(S): at 05:47

## 2024-06-05 RX ADMIN — Medication 200 MILLIGRAM(S): at 05:45

## 2024-06-05 RX ADMIN — Medication 650 MILLIGRAM(S): at 06:47

## 2024-06-05 RX ADMIN — ENOXAPARIN SODIUM 40 MILLIGRAM(S): 100 INJECTION SUBCUTANEOUS at 05:45

## 2024-06-05 RX ADMIN — ERTAPENEM SODIUM 120 MILLIGRAM(S): 1 INJECTION, POWDER, LYOPHILIZED, FOR SOLUTION INTRAMUSCULAR; INTRAVENOUS at 21:12

## 2024-06-05 RX ADMIN — Medication 500 MILLIGRAM(S): at 05:45

## 2024-06-05 NOTE — PHYSICAL THERAPY INITIAL EVALUATION ADULT - PLANNED THERAPY INTERVENTIONS, PT EVAL
GOAL: Patient will be able to negotiate 2 flights of steps in 2 weeks/gait training/transfer training

## 2024-06-05 NOTE — PHYSICAL THERAPY INITIAL EVALUATION ADULT - ADDITIONAL COMMENTS
Pt. is Bengali speaking, daughter at the bedside for translation. Patient lives with her family in a private house with 1 ERICK & 2 flights inside w/B/L handrails. As per pt.'s daughter, pt. is able to negotiate stairs independently; PTA, used SC for mobility. Pt. has family support at home and is never alone.

## 2024-06-05 NOTE — DISCHARGE NOTE PROVIDER - NSDCCPCAREPLAN_GEN_ALL_CORE_FT
PRINCIPAL DISCHARGE DIAGNOSIS  Diagnosis: Urinary tract infection  Assessment and Plan of Treatment: No further antibiotics needed  No sign of UTI nor PN on labs/imaging      SECONDARY DISCHARGE DIAGNOSES  Diagnosis: Lupus  Assessment and Plan of Treatment: Continue medications as prescribed   You can follow up at the [facility] fellows clinic located in [address] [place] [address] upon discharge or with existing Rheumatologist Dr. Lauren Moss

## 2024-06-05 NOTE — DISCHARGE NOTE PROVIDER - NSDCMRMEDTOKEN_GEN_ALL_CORE_FT
acetaminophen 325 mg oral tablet: 2 tab(s) orally every 6 hours As needed Temp greater or equal to 38C (100.4F), Mild Pain (1 - 3)  hydroxychloroquine 200 mg oral tablet: 1 tab(s) orally 2 times a day  Rinvoq 15 mg oral tablet, extended release: 1 tab(s) orally once a day  sulfaSALAzine 500 mg oral tablet: 1 tab(s) orally 2 times a day   1 rolling walker: as directed  / height 160.0cm / weight 81.50kg  acetaminophen 325 mg oral tablet: 2 tab(s) orally every 6 hours As needed Temp greater or equal to 38C (100.4F), Mild Pain (1 - 3)  ertapenem 1 g injection: 1 gram(s) intravenously once a day as directed  / esbl - ecoli / through 6/11 x 3 more doses per ID - pt. rec&#x27;d 6/7 dose before discharge  hydroxychloroquine 200 mg oral tablet: 1 tab(s) orally 2 times a day  Rinvoq 15 mg oral tablet, extended release: 1 tab(s) orally once a day  sulfaSALAzine 500 mg oral tablet: 1 tab(s) orally 2 times a day

## 2024-06-05 NOTE — DISCHARGE NOTE PROVIDER - HOSPITAL COURSE
HPI:  64 year old Luxembourgish speaking female with hx of Lupus presents to the ED complaining of persistent dysuria, hematuria, urinary frequency x 3 weeks. She was evaluated on 5/12/24 for her symptoms and completed a 12 day course of antibiotics (unable to remember name). She states her symptoms did not improve with the antibiotics and she is now complaining of lower abdominal pain and left flank pain. Denies kidney issues in the past, hx of UTIs, or kidney stones. She denies nausea, vomiting, fever, chills. She is also endorsing bilateral knee pain for the past few months, L>R, with crepitus. She ambulates with a cane at baseline. No pain medications at home. No asymmetric swelling or redness. No trauma. (03 Jun 2024 21:40)    Hospital Course:  ptn received CTX in the ED. no sign of UTI nor PN on labs/imaging  - cont IVF, analgesics    #history of SLE/RA  -follows with Dr. Lauren Moss  -takes HCQ/SSZ and Rinvoq  -last serologies from [facility] in 4/2024 with normal dsDNA/C3/C4/RF/CCP  -will check serologies here, outpt f/u    ##neutropenia and thrombocytopenia  -reports bone marrow biopsy 2 years ago negative for malignancy  -was told that was result of immunosuppression  -suspect component of SLE causing the bicytopenia  -also with mild splenomegaly ?LGL vs Felty syndrome if confirmed to have RA    -supportive pain measures, can trial gabapentin for neuropathic pain 300mg qHs for now    Important Medication Changes and Reason:    Active or Pending Issues Requiring Follow-up:    Advanced Directives:   [x ] Full code  [ ] DNR  [ ] Hospice    Discharge Diagnoses:         HPI:  64 year old Malay speaking female with hx of Lupus presents to the ED complaining of persistent dysuria, hematuria, urinary frequency x 3 weeks. She was evaluated on 5/12/24 for her symptoms and completed a 12 day course of antibiotics (unable to remember name). She states her symptoms did not improve with the antibiotics and she is now complaining of lower abdominal pain and left flank pain. Denies kidney issues in the past, hx of UTIs, or kidney stones. She denies nausea, vomiting, fever, chills. She is also endorsing bilateral knee pain for the past few months, L>R, with crepitus. She ambulates with a cane at baseline. No pain medications at home. No asymmetric swelling or redness. No trauma. (03 Jun 2024 21:40)    Hospital Course: ptn received CTX in the ED. no sign of UTI nor PN on labs/imaging  - cont IVF, analgesics    #history of SLE/RA  -follows with Dr. Lauren Moss  -takes HCQ/SSZ and Rinvoq  -last serologies from [facility] in 4/2024 with normal dsDNA/C3/C4/RF/CCP  -will check serologies here, outpt f/u    ##neutropenia and thrombocytopenia  -reports bone marrow biopsy 2 years ago negative for malignancy  -was told that was result of immunosuppression  -suspect component of SLE causing the bicytopenia  -also with mild splenomegaly ?LGL vs Felty syndrome if confirmed to have RA    -supportive pain measures, can trial gabapentin for neuropathic pain 300mg qHs for now    Important Medication Changes and Reason: Ertapenem IV per ID recs. through 6/11      Active or Pending Issues Requiring Follow-up: Followup with PCP  / Discharge d/w Dr. Portillo in detail      Advanced Directives:   [x ] Full code  [ ] DNR  [ ] Hospice    Discharge Diagnoses:  UTI   SLE / RA  Neutropenia / Thrombocytopenia

## 2024-06-05 NOTE — CONSULT NOTE ADULT - ASSESSMENT
Impression/Hospital Course:  64 year old Slovenian speaking female with hx of Lupus presents to the ED complaining of persistent dysuria, hematuria, urinary frequency x 3 weeks. She was evaluated on 5/12/24 for her symptoms and was given trimethoprim-sulfamethoxazole from 5/15 - 5/25 which didn't improve her symptoms. Currently she still admits to lower abdominal pain that radiates to her R flank and side but denies having any fevers at this time. Denies kidney issues in the past, hx of UTIs, or kidney stones. She denies nausea, vomiting, fever, chills. She states that her outpatient doctor didn't take any urine cultures. Since being in the hospital her symptoms haven't improved and she continued to have urinary frequency, pain with urination and the pain in her stomach and side. Daughter present at bedside and helped with translation    Antimicrobials:  Ceftriaxone 6/3    Assessment:  *UTI with UA showing small leukocytosis, 27 WBC and negative bacteria with urine culture growing E.coli pending sensitives. Previously completed course of trimethoprim-sulfamethoxazole likely pointing towards bacterial resistance  *Leukopenia with lymphocytic predominance  *Thrombocytopenia   *Lupus    *ESR 25    Recommendations:   - Start Ertapenem 1g IV QD as patient is likely to be colonized with resistant bacteria considering lack of improvement on trimethoprim-sulfamethoxazole  - trend temperature, ESR/CRP and WBC/Lymphocytes curve   - follow urine cultures (received by lab with results pending), adjust antimicrobial therapy based off of culture and sensitivity     Slade Josue DO, PGY-4   Infectious Disease Fellow  [employer] Teams Preferred  After 5pm/weekends call [phone]

## 2024-06-05 NOTE — DISCHARGE NOTE PROVIDER - CARE PROVIDER_API CALL
Lauren Moss  Rheumatology  [address] [place] [address]  Phone: [phone]  Fax: [phone]  Follow Up Time:

## 2024-06-05 NOTE — CONSULT NOTE ADULT - ATTENDING COMMENTS
64 year old Croatian speaking female with hx of Lupus presents to the ED complaining of persistent dysuria, hematuria, urinary frequency x 3 weeks. She was evaluated on 5/12/24 for her symptoms and was given trimethoprim-sulfamethoxazole from 5/15 - 5/25 which didn't improve her symptoms. Currently she still admits to lower abdominal pain that radiates to her R flank and side but denies having any fevers at this time. Denies kidney issues in the past, hx of UTIs, or kidney stones. She denies nausea, vomiting, fever, chills. She states that her outpatient doctor didn't take any urine cultures. Since being in the hospital her symptoms haven't improved and she continued to have urinary frequency, pain with urination and the pain in her stomach and side. Daughter present at bedside and helped with translation    Antimicrobials:  Ceftriaxone 6/3    Assessment:  *UTI with UA showing small leukocytosis, 27 WBC and negative bacteria with urine culture growing E.coli pending sensitives. Previously completed course of trimethoprim-sulfamethoxazole likely pointing towards bacterial resistance  *Leukopenia with lymphocytic predominance  *Thrombocytopenia   *Lupus    *ESR 25  immunosuppressed state.     Recommendations:   - Start Ertapenem 1g IV QD empirically   - trend WBC/Lymphocytes curve   - follow urine cultures (received by lab with results pending), adjust antimicrobial therapy based off of culture and sensitivity   - check blood cx if spikes.       Plan discussed with consulting team.     Lowell Fuentse  Please contact through MS Teams   If no response or past 5 pm/weekend call [phone].
pt seen with daughter at bedside just briefly before leaving for imaging  Acute admit is for UTI  We will complete full assessment as outpatient and follow up thereafter  daughter is a patient at our [facility] Rheumatology clinic and plans to bring the patient there   recall with questions via teams

## 2024-06-05 NOTE — CONSULT NOTE ADULT - SUBJECTIVE AND OBJECTIVE BOX
Patient is a 64y old  Female who presents with a chief complaint of urinary frequency  HPI:  64 year old Malay speaking female with hx of Lupus presents to the ED complaining of persistent dysuria, hematuria, urinary frequency x 3 weeks. She was evaluated on 5/12/24 for her symptoms and was given trimethoprim-sulfamethoxazole from 5/15 - 5/25 which didn't improve her symptoms. Currently she still admits to lower abdominal pain that radiates to her R flank and side but denies having any fevers at this time. Denies kidney issues in the past, hx of UTIs, or kidney stones. She denies nausea, vomiting, fever, chills. She states that her outpatient doctor didn't take any urine cultures. Since being in the hospital her symptoms haven't improved and she continued to have urinary frequency, pain with urination and the pain in her stomach and side. Daughter present at bedside and helped with translation.    REVIEW OF SYSTEMS  Constitutional: No fevers, No chills, No weight loss, No fatigue   Skin: No rash, no phlebitis	  Eyes: No discharge, No change in vision	  ENMT: No sore throat, No ulcers  Respiratory: No cough, no SOB  Cardiovascular:  No chest pain, No palpitations   Gastrointestinal: No pain, No nausea, No vomiting, No diarrhea, No constipation	  Genitourinary: Positive dysuria and frequency, No hesitancy, Positive flank pain  MSK: No Joint pain, No back pain, No edema  Neurological: No HA, no weakness, no seizures, no AMS     prior hospital charts reviewed [V]  primary team notes reviewed [V]  other consultant notes reviewed [V]    PAST MEDICAL & SURGICAL HISTORY:  Systemic lupus erythematosus      Lupus      No significant past surgical history          SOCIAL HISTORY:  Denied smoking/vaping/alcohol/recreational drug use, no sick contacts    FAMILY HISTORY:  No pertinent family history in first degree relatives        Allergies  No Known Allergies        ANTIMICROBIALS:  hydroxychloroquine 200 two times a day      ANTIMICROBIALS (past 90 days):  MEDICATIONS  (STANDING):  cefTRIAXone   IVPB   100 mL/Hr IV Intermittent (06-03-24 @ 15:53)    hydroxychloroquine   200 milliGRAM(s) Oral (06-05-24 @ 16:37)   200 milliGRAM(s) Oral (06-05-24 @ 05:45)   200 milliGRAM(s) Oral (06-04-24 @ 17:24)   200 milliGRAM(s) Oral (06-04-24 @ 05:08)        OTHER MEDS:   MEDICATIONS  (STANDING):  acetaminophen     Tablet .. 650 every 6 hours PRN  enoxaparin Injectable 40 every 24 hours  gabapentin 300 at bedtime  sulfaSALAzine 500 two times a day      VITALS:  Vital Signs Last 24 Hrs  T(F): 98.3 (06-05-24 @ 12:26), Max: 98.7 (06-03-24 @ 10:23)    Vital Signs Last 24 Hrs  HR: 67 (06-05-24 @ 12:26) (63 - 77)  BP: 124/77 (06-05-24 @ 12:26) (110/85 - 124/81)  RR: 18 (06-05-24 @ 12:26)  SpO2: 96% (06-05-24 @ 12:26) (96% - 98%)  Wt(kg): --    EXAM:  General: Patient appears comfortable, no acute distress  HEENT: NCAT, PERRL, anicteric sclera, mucous membranes moist and intact  Neck: Supple, No lymphadenopathy  CV: +S1/S2, RRR, no M/R/G  Lungs: No respiratory distress, CTA b/l, no wheezing, rales or rhonchi  Abd:  BS4+, Soft, NTND, no guarding  : positive suprapubic tenderness, slight tenderness in R flank  Neuro: AAOx3. No focal deficits noted.   Ext: No cyanosis, no edema  Msk: freely moving upper and lower extremities  Skin: No rash, no phlebitis, No erythema     WBC Trend:  WBC Count: 2.98 (06-03-24 @ 12:18)      Auto Neutrophil #: 1.04 K/uL (06-03-24 @ 12:18)  Band Neutrophils %: 0.9 % (06-03-24 @ 12:18)      Creatine Trend:  Creatinine: 0.63 (06-03)      Liver Biochemical Testing Trend:  Alanine Aminotransferase (ALT/SGPT): 9 *L* (06-03)  Aspartate Aminotransferase (AST/SGOT): 14 (06-03-24 @ 12:18)  Bilirubin Total: 0.2 (06-03)    Auto Eosinophil %: 4.5 % (06-03-24 @ 12:18)    MICROBIOLOGY:  Culture - Urine (collected 03 Jun 2024 14:59)  Source: Clean Catch Clean Catch (Midstream)  Preliminary Report:    >100,000 CFU/ml Escherichia coli    C-Reactive Protein: <3 (06-04)    RADIOLOGY:  < from: Xray Hand 2 Views, Bilateral (06.04.24 @ 17:49) >  IMPRESSION:    Left hand: No evidence of osseous erosion or destruction. There is   negative ulnar variance with mild right distal radial ulnar joint   arthrosis. Remaining joint spaces appear grossly preserved. No evidence   of acute fracture or dislocation.    Right hand: No evidence of osseous erosion. Mild basilar joint arthrosis.   Remaining joint spaces are preserved. No evidence of acute fracture or   dislocation.      < from: Xray Lumbar Spine AP + Lateral (06.04.24 @ 17:48) >  IMPRESSION:    There is mildlevoscoliosis lumbar spine. There is trace left lateral   listhesis of L2 on L3. Lumbar lordosis is maintained. Vertebral body   heights and disc heights are preserved.    < from: CT Abdomen and Pelvis w/ IV Cont (06.03.24 @ 13:08) >  IMPRESSION: No urinary calculus or obstructive uropathy.    Mild splenomegaly.       Patient is a 64y old  Female who presents with a chief complaint of urinary frequency  HPI:  64 year old Bulgarian speaking female with hx of Lupus presents to the ED complaining of persistent dysuria, hematuria, urinary frequency x 3 weeks. She was evaluated on 5/12/24 for her symptoms and was given trimethoprim-sulfamethoxazole from 5/15 - 5/25 which didn't improve her symptoms. Currently she still admits to lower abdominal pain that radiates to her R flank and side but denies having any fevers at this time. Denies kidney issues in the past, hx of UTIs, or kidney stones. She denies nausea, vomiting, fever, chills. She states that her outpatient doctor didn't take any urine cultures. Since being in the hospital her symptoms haven't improved and she continued to have urinary frequency, pain with urination and the pain in her stomach and side. Daughter present at bedside and helped with translation.    REVIEW OF SYSTEMS  Constitutional: No fevers, No chills,     Skin: No rash, no phlebitis	  Eyes: No discharge, No change in vision	  ENMT: No sore throat, No ulcers  Respiratory: No cough, no SOB  Cardiovascular:  No chest pain, No palpitations   Gastrointestinal: No pain, No nausea, No vomiting,  Genitourinary: Positive dysuria and frequency, No hesitancy, Positive flank pain  MSK: No Joint pain, No back pain, No edema  Neurological: No HA, no AMS   Extremities: No edema      prior hospital charts reviewed [V]  primary team notes reviewed [V]  other consultant notes reviewed [V]    PAST MEDICAL & SURGICAL HISTORY:  Systemic lupus erythematosus      Lupus      No significant past surgical history          SOCIAL HISTORY:  Denied smoking, lives with family.       FAMILY HISTORY:  No pertinent family history of lupus in first degree relatives        Allergies  No Known Allergies        ANTIMICROBIALS:  hydroxychloroquine 200 two times a day      ANTIMICROBIALS (past 90 days):  MEDICATIONS  (STANDING):  cefTRIAXone   IVPB   100 mL/Hr IV Intermittent (06-03-24 @ 15:53)    hydroxychloroquine   200 milliGRAM(s) Oral (06-05-24 @ 16:37)   200 milliGRAM(s) Oral (06-05-24 @ 05:45)   200 milliGRAM(s) Oral (06-04-24 @ 17:24)   200 milliGRAM(s) Oral (06-04-24 @ 05:08)        OTHER MEDS:   MEDICATIONS  (STANDING):  acetaminophen     Tablet .. 650 every 6 hours PRN  enoxaparin Injectable 40 every 24 hours  gabapentin 300 at bedtime  sulfaSALAzine 500 two times a day      VITALS:  Vital Signs Last 24 Hrs  T(F): 98.3 (06-05-24 @ 12:26), Max: 98.7 (06-03-24 @ 10:23)    Vital Signs Last 24 Hrs  HR: 67 (06-05-24 @ 12:26) (63 - 77)  BP: 124/77 (06-05-24 @ 12:26) (110/85 - 124/81)  RR: 18 (06-05-24 @ 12:26)  SpO2: 96% (06-05-24 @ 12:26) (96% - 98%)  Wt(kg): --    EXAM:  General: Patient appears comfortable, no acute distress  HEENT: anicteric sclera, mucous membranes moist and intact  Neck: Supple,   CV: +S1/S2, RRR, no M/R/G  Lungs: No respiratory distress, CTA b/l,  Abd:  BS4+, Soft, NTND  : positive suprapubic tenderness, slight tenderness in R flank  Neuro: AAOx3. No focal deficits noted.   Ext: No edema  Msk: no joint swelling   Skin: No rash, no phlebitis, No erythema       WBC Trend:  WBC Count: 2.98 (06-03-24 @ 12:18)      Auto Neutrophil #: 1.04 K/uL (06-03-24 @ 12:18)  Band Neutrophils %: 0.9 % (06-03-24 @ 12:18)      Creatine Trend:  Creatinine: 0.63 (06-03)      Liver Biochemical Testing Trend:  Alanine Aminotransferase (ALT/SGPT): 9 *L* (06-03)  Aspartate Aminotransferase (AST/SGOT): 14 (06-03-24 @ 12:18)  Bilirubin Total: 0.2 (06-03)    Auto Eosinophil %: 4.5 % (06-03-24 @ 12:18)    MICROBIOLOGY:  Culture - Urine (collected 03 Jun 2024 14:59)  Source: Clean Catch Clean Catch (Midstream)  Preliminary Report:    >100,000 CFU/ml Escherichia coli    C-Reactive Protein: <3 (06-04)    RADIOLOGY: Imaging reviewed personally and interpretation as mentioned below.       < from: Xray Hand 2 Views, Bilateral (06.04.24 @ 17:49) >  IMPRESSION:    Left hand: No evidence of osseous erosion or destruction. There is   negative ulnar variance with mild right distal radial ulnar joint   arthrosis. Remaining joint spaces appear grossly preserved. No evidence   of acute fracture or dislocation.    Right hand: No evidence of osseous erosion. Mild basilar joint arthrosis.   Remaining joint spaces are preserved. No evidence of acute fracture or   dislocation.      < from: Xray Lumbar Spine AP + Lateral (06.04.24 @ 17:48) >  IMPRESSION:    There is mildlevoscoliosis lumbar spine. There is trace left lateral   listhesis of L2 on L3. Lumbar lordosis is maintained. Vertebral body   heights and disc heights are preserved.    < from: CT Abdomen and Pelvis w/ IV Cont (06.03.24 @ 13:08) >  IMPRESSION: No urinary calculus or obstructive uropathy.    Mild splenomegaly.

## 2024-06-05 NOTE — PHYSICAL THERAPY INITIAL EVALUATION ADULT - NSPTDMEREC_GEN_A_CORE
Patient will require a rolling walker at home due to their Dx of gait instability 2/2 pain & impaired balance to help complete MRADL's. (Mobility related Activity for daily living)./rolling walker

## 2024-06-05 NOTE — PHYSICAL THERAPY INITIAL EVALUATION ADULT - PERTINENT HX OF CURRENT PROBLEM, REHAB EVAL
64 year old Kinyarwanda speaking female with hx of Lupus presents to the ED complaining of persistent dysuria, hematuria, urinary frequency x 3 weeks. She was evaluated on 5/12/24 for her symptoms and completed a 12 day course of antibiotics (unable to remember name). She states her symptoms did not improve with the antibiotics and she is now complaining of lower abdominal pain and left flank pain. Denies kidney issues in the past, hx of UTIs, or kidney stones. She denies nausea, vomiting, fever, chills. She is also endorsing bilateral knee pain for the past few months, L>R, with crepitus. She ambulates with a cane at baseline. No pain medications at home. No asymmetric swelling or redness. No trauma. Hospital course: (6/3) X-ray BL knee: Early osteoarthritis. (6/3) CT Abd/Pelvis: No urinary calculus or obstructive uropathy.  Mild splenomegaly.

## 2024-06-06 LAB
-  AMPICILLIN/SULBACTAM: SIGNIFICANT CHANGE UP
-  AMPICILLIN: SIGNIFICANT CHANGE UP
-  AZTREONAM: SIGNIFICANT CHANGE UP
-  CEFAZOLIN: SIGNIFICANT CHANGE UP
-  CEFEPIME: SIGNIFICANT CHANGE UP
-  CEFTRIAXONE: SIGNIFICANT CHANGE UP
-  CEFUROXIME: SIGNIFICANT CHANGE UP
-  CIPROFLOXACIN: SIGNIFICANT CHANGE UP
-  ERTAPENEM: SIGNIFICANT CHANGE UP
-  GENTAMICIN: SIGNIFICANT CHANGE UP
-  IMIPENEM: SIGNIFICANT CHANGE UP
-  LEVOFLOXACIN: SIGNIFICANT CHANGE UP
-  MEROPENEM: SIGNIFICANT CHANGE UP
-  NITROFURANTOIN: SIGNIFICANT CHANGE UP
-  PIPERACILLIN/TAZOBACTAM: SIGNIFICANT CHANGE UP
-  TOBRAMYCIN: SIGNIFICANT CHANGE UP
-  TRIMETHOPRIM/SULFAMETHOXAZOLE: SIGNIFICANT CHANGE UP
ANION GAP SERPL CALC-SCNC: 13 MMOL/L — SIGNIFICANT CHANGE UP (ref 5–17)
BUN SERPL-MCNC: 10 MG/DL — SIGNIFICANT CHANGE UP (ref 7–23)
CALCIUM SERPL-MCNC: 9.2 MG/DL — SIGNIFICANT CHANGE UP (ref 8.4–10.5)
CCP IGG SERPL-ACNC: <8 UNITS — SIGNIFICANT CHANGE UP
CHLORIDE SERPL-SCNC: 108 MMOL/L — SIGNIFICANT CHANGE UP (ref 96–108)
CO2 SERPL-SCNC: 22 MMOL/L — SIGNIFICANT CHANGE UP (ref 22–31)
CREAT ?TM UR-MCNC: 51 MG/DL — SIGNIFICANT CHANGE UP
CREAT SERPL-MCNC: 0.61 MG/DL — SIGNIFICANT CHANGE UP (ref 0.5–1.3)
CRP SERPL-MCNC: <3 MG/L — SIGNIFICANT CHANGE UP (ref 0–4)
CULTURE RESULTS: ABNORMAL
EGFR: 99 ML/MIN/1.73M2 — SIGNIFICANT CHANGE UP
ERYTHROCYTE [SEDIMENTATION RATE] IN BLOOD: 30 MM/HR — HIGH (ref 0–20)
GLUCOSE SERPL-MCNC: 95 MG/DL — SIGNIFICANT CHANGE UP (ref 70–99)
HCT VFR BLD CALC: 35 % — SIGNIFICANT CHANGE UP (ref 34.5–45)
HGB BLD-MCNC: 11.7 G/DL — SIGNIFICANT CHANGE UP (ref 11.5–15.5)
MCHC RBC-ENTMCNC: 29.8 PG — SIGNIFICANT CHANGE UP (ref 27–34)
MCHC RBC-ENTMCNC: 33.4 GM/DL — SIGNIFICANT CHANGE UP (ref 32–36)
MCV RBC AUTO: 89.3 FL — SIGNIFICANT CHANGE UP (ref 80–100)
METHOD TYPE: SIGNIFICANT CHANGE UP
NRBC # BLD: 0 /100 WBCS — SIGNIFICANT CHANGE UP (ref 0–0)
ORGANISM # SPEC MICROSCOPIC CNT: ABNORMAL
ORGANISM # SPEC MICROSCOPIC CNT: ABNORMAL
PLATELET # BLD AUTO: 53 K/UL — LOW (ref 150–400)
POTASSIUM SERPL-MCNC: 3.6 MMOL/L — SIGNIFICANT CHANGE UP (ref 3.5–5.3)
POTASSIUM SERPL-SCNC: 3.6 MMOL/L — SIGNIFICANT CHANGE UP (ref 3.5–5.3)
PROT ?TM UR-MCNC: 8 MG/DL — SIGNIFICANT CHANGE UP (ref 0–12)
PROT/CREAT UR-RTO: 0.2 RATIO — SIGNIFICANT CHANGE UP (ref 0–0.2)
RBC # BLD: 3.92 M/UL — SIGNIFICANT CHANGE UP (ref 3.8–5.2)
RBC # FLD: 13.3 % — SIGNIFICANT CHANGE UP (ref 10.3–14.5)
RF+CCP IGG SER-IMP: NEGATIVE — SIGNIFICANT CHANGE UP
SODIUM SERPL-SCNC: 143 MMOL/L — SIGNIFICANT CHANGE UP (ref 135–145)
SPECIMEN SOURCE: SIGNIFICANT CHANGE UP
WBC # BLD: 2.06 K/UL — LOW (ref 3.8–10.5)
WBC # FLD AUTO: 2.06 K/UL — LOW (ref 3.8–10.5)

## 2024-06-06 PROCEDURE — 99232 SBSQ HOSP IP/OBS MODERATE 35: CPT

## 2024-06-06 RX ADMIN — Medication 500 MILLIGRAM(S): at 17:23

## 2024-06-06 RX ADMIN — Medication 500 MILLIGRAM(S): at 05:55

## 2024-06-06 RX ADMIN — Medication 650 MILLIGRAM(S): at 10:33

## 2024-06-06 RX ADMIN — GABAPENTIN 300 MILLIGRAM(S): 400 CAPSULE ORAL at 21:09

## 2024-06-06 RX ADMIN — Medication 200 MILLIGRAM(S): at 17:22

## 2024-06-06 RX ADMIN — Medication 650 MILLIGRAM(S): at 11:00

## 2024-06-06 RX ADMIN — ENOXAPARIN SODIUM 40 MILLIGRAM(S): 100 INJECTION SUBCUTANEOUS at 05:54

## 2024-06-06 RX ADMIN — ERTAPENEM SODIUM 120 MILLIGRAM(S): 1 INJECTION, POWDER, LYOPHILIZED, FOR SOLUTION INTRAMUSCULAR; INTRAVENOUS at 22:05

## 2024-06-06 RX ADMIN — Medication 200 MILLIGRAM(S): at 05:55

## 2024-06-06 NOTE — CHART NOTE - NSCHARTNOTEFT_GEN_A_CORE
Medicine NP note     1 Rolling Walker:  Patient requires rolling walker due to UTI / Lupus - (N39.0 / M32.9) for safe ambulation at discharge.    AMARI Devine, ZEINAB - BC  [phone]
report given to SOCORRO Kinsey

## 2024-06-07 ENCOUNTER — TRANSCRIPTION ENCOUNTER (OUTPATIENT)
Age: 64
End: 2024-06-07

## 2024-06-07 VITALS
OXYGEN SATURATION: 97 % | DIASTOLIC BLOOD PRESSURE: 83 MMHG | HEART RATE: 81 BPM | SYSTOLIC BLOOD PRESSURE: 139 MMHG | RESPIRATION RATE: 18 BRPM | TEMPERATURE: 98 F

## 2024-06-07 PROCEDURE — 80048 BASIC METABOLIC PNL TOTAL CA: CPT

## 2024-06-07 PROCEDURE — 86431 RHEUMATOID FACTOR QUANT: CPT

## 2024-06-07 PROCEDURE — 85025 COMPLETE CBC W/AUTO DIFF WBC: CPT

## 2024-06-07 PROCEDURE — 81001 URINALYSIS AUTO W/SCOPE: CPT

## 2024-06-07 PROCEDURE — 84156 ASSAY OF PROTEIN URINE: CPT

## 2024-06-07 PROCEDURE — 85652 RBC SED RATE AUTOMATED: CPT

## 2024-06-07 PROCEDURE — 74177 CT ABD & PELVIS W/CONTRAST: CPT | Mod: MC

## 2024-06-07 PROCEDURE — 86200 CCP ANTIBODY: CPT

## 2024-06-07 PROCEDURE — 86146 BETA-2 GLYCOPROTEIN ANTIBODY: CPT

## 2024-06-07 PROCEDURE — 86225 DNA ANTIBODY NATIVE: CPT

## 2024-06-07 PROCEDURE — 73564 X-RAY EXAM KNEE 4 OR MORE: CPT

## 2024-06-07 PROCEDURE — 96375 TX/PRO/DX INJ NEW DRUG ADDON: CPT

## 2024-06-07 PROCEDURE — 82570 ASSAY OF URINE CREATININE: CPT

## 2024-06-07 PROCEDURE — 96374 THER/PROPH/DIAG INJ IV PUSH: CPT

## 2024-06-07 PROCEDURE — 86160 COMPLEMENT ANTIGEN: CPT

## 2024-06-07 PROCEDURE — 72100 X-RAY EXAM L-S SPINE 2/3 VWS: CPT

## 2024-06-07 PROCEDURE — 99285 EMERGENCY DEPT VISIT HI MDM: CPT | Mod: 25

## 2024-06-07 PROCEDURE — 36415 COLL VENOUS BLD VENIPUNCTURE: CPT

## 2024-06-07 PROCEDURE — 85027 COMPLETE CBC AUTOMATED: CPT

## 2024-06-07 PROCEDURE — 99232 SBSQ HOSP IP/OBS MODERATE 35: CPT

## 2024-06-07 PROCEDURE — 87186 SC STD MICRODIL/AGAR DIL: CPT

## 2024-06-07 PROCEDURE — 80053 COMPREHEN METABOLIC PANEL: CPT

## 2024-06-07 PROCEDURE — 97161 PT EVAL LOW COMPLEX 20 MIN: CPT

## 2024-06-07 PROCEDURE — 86235 NUCLEAR ANTIGEN ANTIBODY: CPT

## 2024-06-07 PROCEDURE — 72052 X-RAY EXAM NECK SPINE 6/>VWS: CPT

## 2024-06-07 PROCEDURE — 87086 URINE CULTURE/COLONY COUNT: CPT

## 2024-06-07 PROCEDURE — 86147 CARDIOLIPIN ANTIBODY EA IG: CPT

## 2024-06-07 PROCEDURE — 86140 C-REACTIVE PROTEIN: CPT

## 2024-06-07 PROCEDURE — 73120 X-RAY EXAM OF HAND: CPT

## 2024-06-07 RX ORDER — ERTAPENEM SODIUM 1 G/1
1 INJECTION, POWDER, LYOPHILIZED, FOR SOLUTION INTRAMUSCULAR; INTRAVENOUS
Qty: 4 | Refills: 0
Start: 2024-06-07 | End: 2024-06-10

## 2024-06-07 RX ORDER — ERTAPENEM SODIUM 1 G/1
1 INJECTION, POWDER, LYOPHILIZED, FOR SOLUTION INTRAMUSCULAR; INTRAVENOUS
Qty: 3 | Refills: 0
Start: 2024-06-07 | End: 2024-06-09

## 2024-06-07 RX ADMIN — ENOXAPARIN SODIUM 40 MILLIGRAM(S): 100 INJECTION SUBCUTANEOUS at 05:50

## 2024-06-07 RX ADMIN — Medication 500 MILLIGRAM(S): at 05:49

## 2024-06-07 RX ADMIN — Medication 200 MILLIGRAM(S): at 05:49

## 2024-06-07 RX ADMIN — ERTAPENEM SODIUM 120 MILLIGRAM(S): 1 INJECTION, POWDER, LYOPHILIZED, FOR SOLUTION INTRAMUSCULAR; INTRAVENOUS at 17:16

## 2024-06-07 RX ADMIN — Medication 200 MILLIGRAM(S): at 17:12

## 2024-06-07 RX ADMIN — Medication 500 MILLIGRAM(S): at 17:44

## 2024-06-07 NOTE — PROGRESS NOTE ADULT - SUBJECTIVE AND OBJECTIVE BOX
Patient is a 64y old  Female who presents with a chief complaint of     SUBJECTIVE / OVERNIGHT EVENTS: no events       T(C): 36.9 (06-06-24 @ 20:58), Max: 37.1 (06-06-24 @ 11:53)  HR: 76 (06-06-24 @ 20:58) (76 - 77)  BP: 109/70 (06-06-24 @ 20:58) (109/70 - 119/77)  RR: 18 (06-06-24 @ 20:58) (18 - 18)  SpO2: 95% (06-06-24 @ 20:58) (95% - 98%)      MEDICATIONS  (STANDING):  enoxaparin Injectable 40 milliGRAM(s) SubCutaneous every 24 hours  ertapenem  IVPB 1000 milliGRAM(s) IV Intermittent every 24 hours  gabapentin 300 milliGRAM(s) Oral at bedtime  hydroxychloroquine 200 milliGRAM(s) Oral two times a day  sulfaSALAzine 500 milliGRAM(s) Oral two times a day    MEDICATIONS  (PRN):  acetaminophen     Tablet .. 650 milliGRAM(s) Oral every 6 hours PRN Temp greater or equal to 38C (100.4F), Mild Pain (1 - 3)      PHYSICAL EXAM:  GENERAL: NAD, well-developed  HEAD:  Atraumatic, Normocephalic  EYES: EOMI, conjunctiva and sclera clear  NECK: Supple, No JVD  CHEST/LUNG: Clear to auscultation bilaterally; No wheeze  HEART: Regular rate and rhythm; No murmurs, rubs, or gallops  ABDOMEN: Soft, Nontender, Nondistended; Bowel sounds present  EXTREMITIES:  2+ Peripheral Pulses, No clubbing, cyanosis, or edema  PSYCH: AAOx3  NEUROLOGY: non-focal  SKIN: No rashes or lesions    no new labs   
64yPatient is a 64y old  Female who presents with a chief complaint of     Interval history:  Afebrile, daughter at bedside, dysuria improved but persists, suprapubic pain persists but improved.       Allergies:   No Known Allergies    Antimicrobials:    ertapenem  IVPB 1000 milliGRAM(s) IV Intermittent every 24 hours  hydroxychloroquine 200 milliGRAM(s) Oral two times a day      REVIEW OF SYSTEMS:  No chest pain   No  SOB  No N/V  No rash.     Vital Signs Last 24 Hrs  T(C): 37.1 (06-06-24 @ 11:53), Max: 37.1 (06-06-24 @ 11:53)  T(F): 98.8 (06-06-24 @ 11:53), Max: 98.8 (06-06-24 @ 11:53)  HR: 77 (06-06-24 @ 11:53) (65 - 88)  BP: 119/77 (06-06-24 @ 11:53) (105/65 - 119/77)  BP(mean): --  RR: 18 (06-06-24 @ 11:53) (18 - 18)  SpO2: 98% (06-06-24 @ 11:53) (95% - 98%)      PHYSICAL EXAM:  Pt in no acute distress, alert, awake.   breathing comfortably   non distended abdomen, + suprapubic discomfort.   no edema LE   no phlebitis                             11.7   2.06  )-----------( 53       ( 06 Jun 2024 07:23 )             35.0   06-06    143  |  108  |  10  ----------------------------<  95  3.6   |  22  |  0.61    Ca    9.2      06 Jun 2024 07:04        Specimen Source: Clean Catch Clean Catch (Midstream)  Culture Results:   >100,000 CFU/ml Escherichia coli ESBL            
Patient is a 64y old  Female who presents with a chief complaint of     SUBJECTIVE / OVERNIGHT EVENTS:     MEDICATIONS  (STANDING):  enoxaparin Injectable 40 milliGRAM(s) SubCutaneous every 24 hours  hydroxychloroquine 200 milliGRAM(s) Oral two times a day  sulfaSALAzine 500 milliGRAM(s) Oral two times a day    MEDICATIONS  (PRN):  acetaminophen     Tablet .. 650 milliGRAM(s) Oral every 6 hours PRN Temp greater or equal to 38C (100.4F), Mild Pain (1 - 3)      CAPILLARY BLOOD GLUCOSE        I&O's Summary    T(C): 36.3 (06-04-24 @ 20:13), Max: 36.8 (06-04-24 @ 12:51)  HR: 77 (06-04-24 @ 20:13) (69 - 77)  BP: 116/76 (06-04-24 @ 20:13) (116/76 - 124/81)  RR: 18 (06-04-24 @ 20:13) (18 - 18)  SpO2: 96% (06-04-24 @ 20:13) (96% - 98%)    PHYSICAL EXAM:  GENERAL: NAD, well-developed  HEAD:  Atraumatic, Normocephalic  EYES: EOMI, PERRLA, conjunctiva and sclera clear  NECK: Supple, No JVD  CHEST/LUNG: Clear to auscultation bilaterally; No wheeze  HEART: Regular rate and rhythm; No murmurs, rubs, or gallops  ABDOMEN: Soft, Nontender, Nondistended; Bowel sounds present  EXTREMITIES:  2+ Peripheral Pulses, No clubbing, cyanosis, or edema  PSYCH: AAOx3  NEUROLOGY: non-focal  SKIN: No rashes or lesions    LABS:                        11.9   2.98  )-----------( 81       ( 03 Jun 2024 12:18 )             36.1     06-03    142  |  107  |  8   ----------------------------<  105<H>  4.1   |  22  |  0.63    Ca    9.4      03 Jun 2024 12:18    TPro  7.3  /  Alb  4.0  /  TBili  0.2  /  DBili  x   /  AST  14  /  ALT  9<L>  /  AlkPhos  89  06-03          Urinalysis Basic - ( 03 Jun 2024 14:59 )    Color: Yellow / Appearance: Clear / SG: >1.030 / pH: x  Gluc: x / Ketone: Negative mg/dL  / Bili: Negative / Urobili: 0.2 mg/dL   Blood: x / Protein: Negative mg/dL / Nitrite: Negative   Leuk Esterase: Small / RBC: 1 /HPF / WBC 27 /HPF   Sq Epi: x / Non Sq Epi: 1 /HPF / Bacteria: Negative /HPF        RADIOLOGY & ADDITIONAL TESTS:    Imaging Personally Reviewed:    Consultant(s) Notes Reviewed:      Care Discussed with Consultants/Other Providers:  
64yPatient is a 64y old  Female who presents with a chief complaint of       Interval history:  Afebrile, feeling better each day.       Allergies:   No Known Allergies      Antimicrobials:  ertapenem  IVPB 1000 milliGRAM(s) IV Intermittent every 24 hours  hydroxychloroquine 200 milliGRAM(s) Oral two times a day      REVIEW OF SYSTEMS:  No chest pain   No SOB  No rash.       Vital Signs Last 24 Hrs  T(C): 36.9 (06-07-24 @ 12:00), Max: 36.9 (06-06-24 @ 20:58)  T(F): 98.4 (06-07-24 @ 12:00), Max: 98.4 (06-06-24 @ 20:58)  HR: 74 (06-07-24 @ 12:00) (69 - 76)  BP: 109/72 (06-07-24 @ 12:00) (107/69 - 109/72)  BP(mean): --  RR: 18 (06-07-24 @ 12:00) (18 - 18)  SpO2: 98% (06-07-24 @ 12:00) (95% - 98%)      PHYSICAL EXAM:  Pt in no acute distress, alert, awake.   breathing comfortably   non distended abdomen, mild suprapubic discomfort.   no edema LE   no phlebitis                             11.7   2.06  )-----------( 53       ( 06 Jun 2024 07:23 )             35.0   06-06    143  |  108  |  10  ----------------------------<  95  3.6   |  22  |  0.61    Ca    9.2      06 Jun 2024 07:04                
Patient is a 64y old  Female who presents with a chief complaint of     SUBJECTIVE / OVERNIGHT EVENTS: no events     T(C): 36.8 (06-05-24 @ 12:26), Max: 36.8 (06-05-24 @ 12:26)  HR: 67 (06-05-24 @ 12:26) (67 - 67)  BP: 124/77 (06-05-24 @ 12:26) (124/77 - 124/77)  RR: 18 (06-05-24 @ 12:26) (18 - 18)  SpO2: 96% (06-05-24 @ 12:26) (96% - 96%)          MEDICATIONS  (STANDING):  enoxaparin Injectable 40 milliGRAM(s) SubCutaneous every 24 hours  gabapentin 300 milliGRAM(s) Oral at bedtime  hydroxychloroquine 200 milliGRAM(s) Oral two times a day  sulfaSALAzine 500 milliGRAM(s) Oral two times a day    MEDICATIONS  (PRN):  acetaminophen     Tablet .. 650 milliGRAM(s) Oral every 6 hours PRN Temp greater or equal to 38C (100.4F), Mild Pain (1 - 3)    PHYSICAL EXAM:  GENERAL: NAD, well-developed  HEAD:  Atraumatic, Normocephalic  EYES: EOMI, conjunctiva and sclera clear  NECK: Supple, No JVD  CHEST/LUNG: Clear to auscultation bilaterally; No wheeze  HEART: Regular rate and rhythm; No murmurs, rubs, or gallops  ABDOMEN: Soft, Nontender, Nondistended; Bowel sounds present  EXTREMITIES:  2+ Peripheral Pulses, No clubbing, cyanosis, or edema  PSYCH: AAOx3  NEUROLOGY: non-focal  SKIN: No rashes or lesions    no new labs

## 2024-06-07 NOTE — PROGRESS NOTE ADULT - ASSESSMENT
64 year old Danish speaking female with hx of Lupus presents to the ED complaining of persistent dysuria, hematuria, urinary frequency x 3 weeks. She was evaluated on 5/12/24 for her symptoms and completed a 12 day course of antibiotics (unable to remember name). She states her symptoms did not improve with the antibiotics and she is now complaining of lower abdominal pain and left flank pain. Denies kidney issues in the past, hx of UTIs, or kidney stones. She denies nausea, vomiting, fever, chills. She is also endorsing bilateral knee pain for the past few months, L>R, with crepitus. She ambulates with a cane at baseline. No pain medications at home. No asymmetric swelling or redness. No trauma.      UTI Symptoms   E.coli   iv Ceftriaxone   ID eval        Rheum consult appreciated   Back X ray shows   There is mild levoscoliosis lumbar spine. There is trace left lateral   listhesis of L2 on L3. Lumbar lordosis is maintained. Vertebral body   heights and disc heights are preserved.          Plan of care discussed with patients daughter bed side   CT A/P: no PN, no renal stones, no intra-abdominal pathology  UA: sm leuk est, sp grav is elevated    Abdominal Pain  Knee pain  SLE flare    - ptn received CTX in the ED. no sign of UTI nor PN on labs/imaging  - cont IVF, analgesics  - cannot r/o SLE flare, will call rheum for consult  - dvt ppx w sc Lovenox  
64 year old Occitan speaking female with hx of Lupus presents to the ED complaining of persistent dysuria, hematuria, urinary frequency x 3 weeks. She was evaluated on 5/12/24 for her symptoms and was given trimethoprim-sulfamethoxazole from 5/15 - 5/25 which didn't improve her symptoms. Currently she still admits to lower abdominal pain that radiates to her R flank and side but denies having any fevers at this time. Denies kidney issues in the past, hx of UTIs, or kidney stones. She denies nausea, vomiting, fever, chills. She states that her outpatient doctor didn't take any urine cultures. Since being in the hospital her symptoms haven't improved and she continued to have urinary frequency, pain with urination and the pain in her stomach and side. Daughter present at bedside and helped with translation    Antimicrobials:  Ceftriaxone 6/3    Assessment:  *UTI with UA showing small leukocytosis, 27 WBC and negative bacteria with urine culture growing ESBL E.coli.  *Leukopenia with lymphocytic predominance  *Thrombocytopenia   *Lupus    *ESR 25  immunosuppressed state.     improving clinically.     Recommendations:   - c/w Ertapenem 1g IV QD empirically   - trend WBC, + leucopenia   - urine cx with ESBL E coli.   - check blood cx if spikes.   - will need 7 days of therapy with Ertapenem, day 3/7 of therapy today.     Plan discussed with Medicine ACP.     Will sign off, please call with questions.       Lowell Fuentes  Please contact through MS Teams   If no response or past 5 pm/weekend call [phone].   
64 year old Romanian speaking female with hx of Lupus presents to the ED complaining of persistent dysuria, hematuria, urinary frequency x 3 weeks. She was evaluated on 5/12/24 for her symptoms and was given trimethoprim-sulfamethoxazole from 5/15 - 5/25 which didn't improve her symptoms. Currently she still admits to lower abdominal pain that radiates to her R flank and side but denies having any fevers at this time. Denies kidney issues in the past, hx of UTIs, or kidney stones. She denies nausea, vomiting, fever, chills. She states that her outpatient doctor didn't take any urine cultures. Since being in the hospital her symptoms haven't improved and she continued to have urinary frequency, pain with urination and the pain in her stomach and side. Daughter present at bedside and helped with translation    Antimicrobials:  Ceftriaxone 6/3    Assessment:  *UTI with UA showing small leukocytosis, 27 WBC and negative bacteria with urine culture growing ESBL E.coli.  *Leukopenia with lymphocytic predominance  *Thrombocytopenia   *Lupus    *ESR 25  immunosuppressed state.       Recommendations:   - c/w Ertapenem 1g IV QD empirically   - trend WBC, + leucopenia   - urine cx with ESBL E coli.   - check blood cx if spikes.   - will need midline, 7 days of therapy with Ertapenem     Plan discussed with Medicine ACP.       Lowell Fuentes  Please contact through MS Teams   If no response or past 5 pm/weekend call [phone].   
64 year old Maori speaking female with hx of Lupus presents to the ED complaining of persistent dysuria, hematuria, urinary frequency x 3 weeks. She was evaluated on 5/12/24 for her symptoms and completed a 12 day course of antibiotics (unable to remember name). She states her symptoms did not improve with the antibiotics and she is now complaining of lower abdominal pain and left flank pain. Denies kidney issues in the past, hx of UTIs, or kidney stones. She denies nausea, vomiting, fever, chills. She is also endorsing bilateral knee pain for the past few months, L>R, with crepitus. She ambulates with a cane at baseline. No pain medications at home. No asymmetric swelling or redness. No trauma.      UTI Symptoms   E.coli   - c/w Ertapenem 1g IV QD empirically   - trend WBC, + leucopenia   - urine cx with ESBL E coli.   ID eval  consulted       D/C planning am tomorrow     Rheum consult appreciated   Back X ray shows   There is mild levoscoliosis lumbar spine. There is trace left lateral   listhesis of L2 on L3. Lumbar lordosis is maintained. Vertebral body   heights and disc heights are preserved.      Plan of care discussed with patients daughter bed side   CT A/P: no PN, no renal stones, no intra-abdominal pathology  UA: sm leuk est, sp grav is elevated  
64 year old Yi speaking female with hx of Lupus presents to the ED complaining of persistent dysuria, hematuria, urinary frequency x 3 weeks. She was evaluated on 5/12/24 for her symptoms and completed a 12 day course of antibiotics (unable to remember name). She states her symptoms did not improve with the antibiotics and she is now complaining of lower abdominal pain and left flank pain. Denies kidney issues in the past, hx of UTIs, or kidney stones. She denies nausea, vomiting, fever, chills. She is also endorsing bilateral knee pain for the past few months, L>R, with crepitus. She ambulates with a cane at baseline. No pain medications at home. No asymmetric swelling or redness. No trauma.    Rheum consult appreciated   Back X ray     If neg can d/c     Plan of care discussed with patients daughter bed side   CT A/P: no PN, no renal stones, no intra-abdominal pathology  UA: sm leuk est, sp grav is elevated    Abdominal Pain  Knee pain  SLE flare    - ptn received CTX in the ED. no sign of UTI nor PN on labs/imaging  - cont IVF, analgesics  - cannot r/o SLE flare, will call rheum for consult  - dvt ppx w sc Lovenox

## 2024-06-07 NOTE — DISCHARGE NOTE NURSING/CASE MANAGEMENT/SOCIAL WORK - PATIENT PORTAL LINK FT
You can access the FollowMyHealth Patient Portal offered by [facility] by registering at the following website: [email or web address]. By joining [email or web address] FollowMyHealth portal, you will also be able to view your health information using other applications (apps) compatible with our system.

## 2024-06-07 NOTE — DISCHARGE NOTE NURSING/CASE MANAGEMENT/SOCIAL WORK - NSDCPEFALRISK_GEN_ALL_CORE
For information on Fall & Injury Prevention, visit: [email or web address] OR  [email or web address] OR  https://www.cdc.gov/steadi/patient.html

## 2024-06-08 LAB
B2 GLYCOPROT1 IGA SER QL: 95.6 SAU — HIGH
CARDIOLIPIN AB SER-ACNC: NEGATIVE — SIGNIFICANT CHANGE UP

## 2024-06-10 PROBLEM — M32.9 SYSTEMIC LUPUS ERYTHEMATOSUS, UNSPECIFIED: Chronic | Status: ACTIVE | Noted: 2024-06-03

## 2024-06-14 ENCOUNTER — APPOINTMENT (OUTPATIENT)
Dept: ORTHOPEDIC SURGERY | Facility: CLINIC | Age: 64
End: 2024-06-14
Payer: MEDICARE

## 2024-06-14 VITALS — DIASTOLIC BLOOD PRESSURE: 81 MMHG | SYSTOLIC BLOOD PRESSURE: 128 MMHG | HEART RATE: 76 BPM

## 2024-06-14 DIAGNOSIS — M25.562 PAIN IN RIGHT KNEE: ICD-10-CM

## 2024-06-14 DIAGNOSIS — M17.10 UNILATERAL PRIMARY OSTEOARTHRITIS, UNSPECIFIED KNEE: ICD-10-CM

## 2024-06-14 DIAGNOSIS — M17.0 BILATERAL PRIMARY OSTEOARTHRITIS OF KNEE: ICD-10-CM

## 2024-06-14 DIAGNOSIS — M25.561 PAIN IN RIGHT KNEE: ICD-10-CM

## 2024-06-14 PROBLEM — Z00.00 ENCOUNTER FOR PREVENTIVE HEALTH EXAMINATION: Status: ACTIVE | Noted: 2024-06-14

## 2024-06-14 PROCEDURE — 99205 OFFICE O/P NEW HI 60 MIN: CPT | Mod: 25

## 2024-06-14 PROCEDURE — 73562 X-RAY EXAM OF KNEE 3: CPT | Mod: RT,LT

## 2024-06-16 PROBLEM — M17.0 PRIMARY LOCALIZED OSTEOARTHRITIS OF BOTH KNEES: Status: ACTIVE | Noted: 2024-06-16

## 2024-06-20 ENCOUNTER — NON-APPOINTMENT (OUTPATIENT)
Age: 64
End: 2024-06-20

## 2024-06-28 ENCOUNTER — APPOINTMENT (OUTPATIENT)
Dept: RHEUMATOLOGY | Facility: HOSPITAL | Age: 64
End: 2024-06-28

## 2024-06-28 ENCOUNTER — OUTPATIENT (OUTPATIENT)
Dept: OUTPATIENT SERVICES | Facility: HOSPITAL | Age: 64
LOS: 1 days | End: 2024-06-28
Payer: COMMERCIAL

## 2024-06-28 VITALS
WEIGHT: 185.19 LBS | RESPIRATION RATE: 16 BRPM | HEART RATE: 78 BPM | BODY MASS INDEX: 32.81 KG/M2 | HEIGHT: 62.99 IN | SYSTOLIC BLOOD PRESSURE: 113 MMHG | TEMPERATURE: 97 F | DIASTOLIC BLOOD PRESSURE: 76 MMHG

## 2024-06-28 DIAGNOSIS — M25.50 PAIN IN UNSPECIFIED JOINT: ICD-10-CM

## 2024-06-28 DIAGNOSIS — M79.7 FIBROMYALGIA: ICD-10-CM

## 2024-06-28 DIAGNOSIS — M06.9 RHEUMATOID ARTHRITIS, UNSPECIFIED: ICD-10-CM

## 2024-06-28 LAB
ALBUMIN SERPL ELPH-MCNC: 4.6 G/DL — SIGNIFICANT CHANGE UP (ref 3.3–5)
ALP SERPL-CCNC: 97 U/L — SIGNIFICANT CHANGE UP (ref 40–120)
ALT FLD-CCNC: 9 U/L — LOW (ref 10–45)
ANION GAP SERPL CALC-SCNC: 12 MMOL/L — SIGNIFICANT CHANGE UP (ref 5–17)
APTT BLD: 31.9 SEC — SIGNIFICANT CHANGE UP (ref 24.5–35.6)
AST SERPL-CCNC: 14 U/L — SIGNIFICANT CHANGE UP (ref 10–40)
BILIRUB SERPL-MCNC: 0.3 MG/DL — SIGNIFICANT CHANGE UP (ref 0.2–1.2)
BUN SERPL-MCNC: 10 MG/DL — SIGNIFICANT CHANGE UP (ref 7–23)
CALCIUM SERPL-MCNC: 9.6 MG/DL — SIGNIFICANT CHANGE UP (ref 8.4–10.5)
CHLORIDE SERPL-SCNC: 104 MMOL/L — SIGNIFICANT CHANGE UP (ref 96–108)
CO2 SERPL-SCNC: 26 MMOL/L — SIGNIFICANT CHANGE UP (ref 22–31)
CREAT SERPL-MCNC: 0.69 MG/DL — SIGNIFICANT CHANGE UP (ref 0.5–1.3)
EGFR: 96 ML/MIN/1.73M2 — SIGNIFICANT CHANGE UP
GLUCOSE SERPL-MCNC: 84 MG/DL — SIGNIFICANT CHANGE UP (ref 70–99)
POTASSIUM SERPL-MCNC: 3.9 MMOL/L — SIGNIFICANT CHANGE UP (ref 3.5–5.3)
POTASSIUM SERPL-SCNC: 3.9 MMOL/L — SIGNIFICANT CHANGE UP (ref 3.5–5.3)
PROT SERPL-MCNC: 7.6 G/DL — SIGNIFICANT CHANGE UP (ref 6–8.3)
SODIUM SERPL-SCNC: 142 MMOL/L — SIGNIFICANT CHANGE UP (ref 135–145)

## 2024-06-28 PROCEDURE — 86148 ANTI-PHOSPHOLIPID ANTIBODY: CPT

## 2024-06-28 PROCEDURE — 73030 X-RAY EXAM OF SHOULDER: CPT | Mod: 26,LT,RT

## 2024-06-28 PROCEDURE — G0463: CPT

## 2024-06-28 PROCEDURE — 86038 ANTINUCLEAR ANTIBODIES: CPT

## 2024-06-28 PROCEDURE — 86147 CARDIOLIPIN ANTIBODY EA IG: CPT

## 2024-06-28 PROCEDURE — 73030 X-RAY EXAM OF SHOULDER: CPT

## 2024-06-28 PROCEDURE — 85730 THROMBOPLASTIN TIME PARTIAL: CPT

## 2024-06-28 PROCEDURE — 80053 COMPREHEN METABOLIC PANEL: CPT

## 2024-06-28 PROCEDURE — 36415 COLL VENOUS BLD VENIPUNCTURE: CPT

## 2024-06-28 PROCEDURE — 85598 HEXAGNAL PHOSPH PLTLT NEUTRL: CPT

## 2024-06-28 PROCEDURE — 85613 RUSSELL VIPER VENOM DILUTED: CPT

## 2024-06-28 PROCEDURE — 86146 BETA-2 GLYCOPROTEIN ANTIBODY: CPT

## 2024-06-28 PROCEDURE — 85027 COMPLETE CBC AUTOMATED: CPT

## 2024-06-28 PROCEDURE — 99213 OFFICE O/P EST LOW 20 MIN: CPT | Mod: GC

## 2024-06-29 LAB
B2 GLYCOPROT1 AB SER QL: NEGATIVE — SIGNIFICANT CHANGE UP
CARDIOLIPIN AB SER-ACNC: NEGATIVE — SIGNIFICANT CHANGE UP
HCT VFR BLD CALC: 39.5 % — SIGNIFICANT CHANGE UP (ref 34.5–45)
HGB BLD-MCNC: 12.8 G/DL — SIGNIFICANT CHANGE UP (ref 11.5–15.5)
MCHC RBC-ENTMCNC: 29 PG — SIGNIFICANT CHANGE UP (ref 27–34)
MCHC RBC-ENTMCNC: 32.4 GM/DL — SIGNIFICANT CHANGE UP (ref 32–36)
MCV RBC AUTO: 89.6 FL — SIGNIFICANT CHANGE UP (ref 80–100)
PLATELET # BLD AUTO: 58 K/UL — LOW (ref 150–400)
RBC # BLD: 4.41 M/UL — SIGNIFICANT CHANGE UP (ref 3.8–5.2)
RBC # FLD: 14.6 % — HIGH (ref 10.3–14.5)
WBC # BLD: 2.37 K/UL — LOW (ref 3.8–10.5)
WBC # FLD AUTO: 2.37 K/UL — LOW (ref 3.8–10.5)

## 2024-07-01 PROBLEM — M25.50 POLYARTHRALGIA: Status: ACTIVE | Noted: 2024-06-28

## 2024-07-01 LAB
ANA TITR SER: NEGATIVE — SIGNIFICANT CHANGE UP
DRVVT RATIO: 0.92 RATIO — SIGNIFICANT CHANGE UP (ref 0–1.21)
DRVVT SCREEN TO CONFIRM RATIO: SIGNIFICANT CHANGE UP
NORMALIZED SCT PPP-RTO: 0.91 RATIO — SIGNIFICANT CHANGE UP (ref 0–1.16)
NORMALIZED SCT PPP-RTO: SIGNIFICANT CHANGE UP

## 2024-07-08 LAB
PS-PROTHROM CMPLX IGG SERPL IA-ACNC: <10 UNITS — SIGNIFICANT CHANGE UP
PS-PROTHROM CMPLX IGM SERPL IA-ACNC: 14 UNITS — SIGNIFICANT CHANGE UP

## 2024-07-12 DIAGNOSIS — D69.6 THROMBOCYTOPENIA, UNSPECIFIED: ICD-10-CM

## 2024-07-12 DIAGNOSIS — M79.7 FIBROMYALGIA: ICD-10-CM

## 2024-07-12 DIAGNOSIS — M25.50 PAIN IN UNSPECIFIED JOINT: ICD-10-CM

## 2024-07-13 NOTE — ED PROVIDER NOTE - OBJECTIVE STATEMENT
64 year old Romansh speaking female with hx of Lupus presents to the ED complaining of persistent dysuria, hematuria, urinary frequency x 3 weeks. She was evaluated on 5/12/24 for her symptoms and completed a 12 day course of antibiotics (unable to remember name). She states her symptoms did ont improve with the antibiotics and she is now complaining of lower abdominal pain and left flank pain. Denies kidney issues in the past, hx of UTIs, or kidney stones. She denies nausea, vomiting, fever, chills. She is also endorsing bilateral knee pain for the past few months, L>R, with crepitus. She ambulates with a cane at baseline. No pain medications at home. No asymmetric swelling or redness. No trauma.
- hold BP meds for now, c/w midodrine for BP support

## 2024-08-16 ENCOUNTER — APPOINTMENT (OUTPATIENT)
Dept: RHEUMATOLOGY | Facility: HOSPITAL | Age: 64
End: 2024-08-16
Payer: MEDICARE

## 2024-08-16 VITALS
HEART RATE: 75 BPM | RESPIRATION RATE: 16 BRPM | WEIGHT: 185 LBS | HEIGHT: 62.99 IN | TEMPERATURE: 97 F | DIASTOLIC BLOOD PRESSURE: 81 MMHG | BODY MASS INDEX: 32.78 KG/M2 | SYSTOLIC BLOOD PRESSURE: 116 MMHG

## 2024-08-16 DIAGNOSIS — M25.50 PAIN IN UNSPECIFIED JOINT: ICD-10-CM

## 2024-08-16 DIAGNOSIS — M79.7 FIBROMYALGIA: ICD-10-CM

## 2024-08-16 DIAGNOSIS — G89.29 CERVICALGIA: ICD-10-CM

## 2024-08-16 DIAGNOSIS — M54.2 CERVICALGIA: ICD-10-CM

## 2024-08-16 PROCEDURE — 99215 OFFICE O/P EST HI 40 MIN: CPT | Mod: GC

## 2024-08-18 NOTE — PROCEDURE
[Other Date:___] : Date: [unfilled] [Soft Tissue Injection] : soft tissue injection was performed [Risks] : risks [Benefits] : benefits [Consent Obtained] : written consent was obtained prior to the procedure and is detailed in the patient's record [Patient] : Prior to the start of the procedure a time out was taken and the identity of the patient was confirmed via name and date of birth with the patient. The correct site and the procedure to be performed were confirmed. The correct side was confirmed if applicable. The availability of the correct equipment was verified [Therapeutic] : therapeutic [#1 Site: ______] : #1 site identified in the [unfilled] [___ ml Inj] : [unfilled] ~Uml [1%] : 1%  [Without Epi] : without epinephrine [Betadine] : betadine solution [Chlorhexidine] : chlorhexidine [25 gauge 1 inch] : A 25 gauge 1 inch needle was used [___ml 1% Lidocaine] : [unfilled] ml of 1% lidocaine [Depomedrol ___ mg] : Depomedrol [unfilled] mg [Tolerated Well] : the patient tolerated the procedure well [No Complications] : there were no complications [Instructions Given] : handouts/patient instructions were given to patient [#2 Site: ___] : # 2 site identified in the [unfilled] [de-identified] : Trigger finger injections  [de-identified] : LOT numbers: [id number], [id number]

## 2024-08-18 NOTE — PHYSICAL EXAM
[General Appearance - Alert] : alert [General Appearance - In No Acute Distress] : in no acute distress [Neck Appearance] : the appearance of the neck was normal [Neck Cervical Mass (___cm)] : no neck mass was observed [Jugular Venous Distention Increased] : there was no jugular-venous distention [Thyroid Diffuse Enlargement] : the thyroid was not enlarged [Thyroid Nodule] : there were no palpable thyroid nodules [Auscultation Breath Sounds / Voice Sounds] : lungs were clear to auscultation bilaterally [Heart Rate And Rhythm] : heart rate was normal and rhythm regular [Heart Sounds] : normal S1 and S2 [Heart Sounds Gallop] : no gallops [Murmurs] : no murmurs [Heart Sounds Pericardial Friction Rub] : no pericardial rub [Bowel Sounds] : normal bowel sounds Shortness of breath [Abdomen Soft] : soft [Abdomen Tenderness] : non-tender [Abdomen Mass (___ Cm)] : no abdominal mass palpated [Abnormal Walk] : normal gait [Nail Clubbing] : no clubbing  or cyanosis of the fingernails [Musculoskeletal - Swelling] : no joint swelling seen [Motor Tone] : muscle strength and tone were normal [Skin Color & Pigmentation] : normal skin color and pigmentation [Skin Turgor] : normal skin turgor [] : no rash [Deep Tendon Reflexes (DTR)] : deep tendon reflexes were 2+ and symmetric [Sensation] : the sensory exam was normal to light touch and pinprick [No Focal Deficits] : no focal deficits [Oriented To Time, Place, And Person] : oriented to person, place, and time [Impaired Insight] : insight and judgment were intact [Affect] : the affect was normal [FreeTextEntry1] : Tenderness on upper arm, neck, forearm, upper legs, and lower back. No synovitis

## 2024-08-18 NOTE — PROCEDURE
[Other Date:___] : Date: [unfilled] [Soft Tissue Injection] : soft tissue injection was performed [Risks] : risks [Benefits] : benefits [Consent Obtained] : written consent was obtained prior to the procedure and is detailed in the patient's record [Patient] : Prior to the start of the procedure a time out was taken and the identity of the patient was confirmed via name and date of birth with the patient. The correct site and the procedure to be performed were confirmed. The correct side was confirmed if applicable. The availability of the correct equipment was verified [Therapeutic] : therapeutic [#1 Site: ______] : #1 site identified in the [unfilled] [___ ml Inj] : [unfilled] ~Uml [1%] : 1%  [Without Epi] : without epinephrine [Betadine] : betadine solution [Chlorhexidine] : chlorhexidine [25 gauge 1 inch] : A 25 gauge 1 inch needle was used [___ml 1% Lidocaine] : [unfilled] ml of 1% lidocaine [Depomedrol ___ mg] : Depomedrol [unfilled] mg [Tolerated Well] : the patient tolerated the procedure well [No Complications] : there were no complications [Instructions Given] : handouts/patient instructions were given to patient [#2 Site: ___] : # 2 site identified in the [unfilled] [de-identified] : Trigger finger injections  [de-identified] : LOT numbers: [id number], [id number]

## 2024-08-18 NOTE — ASSESSMENT
[FreeTextEntry1] : ## History of SLE/RA?? - No classical findings for CTDs  - used to follow up with Dr. Lauren Moss - She was on HCQ/SSZ, was started on Rinvoq about 3 years ago (unclear indication)  - Rinvoq held due to UTI  - last serologies from [facility] in 4/2024 with normal dsDNA/C3/C4/RF/CCP - Recent serologies including JEROME, dsDNA, c3/c4, ANGEL, RF/CCP neg, APS serologies   - Currently on HCQ daily, will stop as there is no evidence for systemic autoimmune disease   ## Chronic neutropenia and thrombocytopenia - Bone marrow biopsy from [facility] on 3/30/21 showed ''hypercellular bone marrow with megakaryocytic hyperplasia, no MDS, No blasts or evidence of lymphoma''  - Cytogenetics normal  -was told that was the result of immunosuppression? however still had thrombocytopenia and leukopenia in 6/28/24  -also with mild splenomegaly  # Trigger fingers in both hands - Steroid injections done for right 2nd and left 3rd finger   ## Fibromyalgia, polyarthralgia -suspect component of OA -no active synovitis seen on the exam, doubt inflammatory arthritis component  Plan 1. Order labs  2. Hematology referral for cytopenia 3. Will stop HCQ 4. Refer to PT for neck. shoulder pains 5. Start cymbalta 30 mg daily   RTC in 3 months  D/w Dr. Terrell Pollard MD Rheumatology Fellow PGY-5

## 2024-08-18 NOTE — ASSESSMENT
[FreeTextEntry1] : ## History of SLE/RA?? - No classical findings for CTDs  - used to follow up with Dr. Lauren Moss - She was on HCQ/SSZ, was started on Rinvoq about 3 years ago (unclear indication)  - Rinvoq held due to UTI  - last serologies from [facility] in 4/2024 with normal dsDNA/C3/C4/RF/CCP - Recent serologies including JEROME, dsDNA, c3/c4, ANGEL, RF/CCP neg, APS serologies   - Currently on HCQ daily, will stop as there is no evidence for systemic autoimmune disease   ## Chronic neutropenia and thrombocytopenia - Bone marrow biopsy from [facility] on 3/30/21 showed ''hypercellular bone marrow with megakaryocytic hyperplasia, no MDS, No blasts or evidence of lymphoma''  - Cytogenetics normal  -was told that was the result of immunosuppression? however still had thrombocytopenia and leukopenia in 6/28/24  -also with mild splenomegaly  # Trigger fingers in both hands - Steroid injections done for right 2nd and left 3rd finger   ## Fibromyalgia, polyarthralgia -suspect component of OA -no active synovitis seen on the exam, doubt inflammatory arthritis component  Plan 1. Order labs  2. Hematology referral for cytopenia 3. Will stop HCQ 4. Refer to PT for neck. shoulder pains 5. Start cymbalta 30 mg daily   RTC in 3 months  D/w Dr. Terrell Pollard MD Rheumatology Fellow PGY-5

## 2024-08-18 NOTE — PROCEDURE
[Other Date:___] : Date: [unfilled] [Soft Tissue Injection] : soft tissue injection was performed [Risks] : risks [Benefits] : benefits [Consent Obtained] : written consent was obtained prior to the procedure and is detailed in the patient's record [Patient] : Prior to the start of the procedure a time out was taken and the identity of the patient was confirmed via name and date of birth with the patient. The correct site and the procedure to be performed were confirmed. The correct side was confirmed if applicable. The availability of the correct equipment was verified [Therapeutic] : therapeutic [#1 Site: ______] : #1 site identified in the [unfilled] [___ ml Inj] : [unfilled] ~Uml [1%] : 1%  [Without Epi] : without epinephrine [Betadine] : betadine solution [Chlorhexidine] : chlorhexidine [25 gauge 1 inch] : A 25 gauge 1 inch needle was used [___ml 1% Lidocaine] : [unfilled] ml of 1% lidocaine [Depomedrol ___ mg] : Depomedrol [unfilled] mg [Tolerated Well] : the patient tolerated the procedure well [No Complications] : there were no complications [Instructions Given] : handouts/patient instructions were given to patient [#2 Site: ___] : # 2 site identified in the [unfilled] [de-identified] : Trigger finger injections  [de-identified] : LOT numbers: [id number], [id number]

## 2024-08-18 NOTE — HISTORY OF PRESENT ILLNESS
[None] : The patient is currently asymptomatic [FreeTextEntry1] : Interval history: The patient pain in the neck, shoulders, upper arms, hands, and lower back, along with unrefreshed sleep for years. She denies any skin rash, joint swelling, fever, weight loss, or oral/nasal ulcers

## 2024-08-18 NOTE — ASSESSMENT
[FreeTextEntry1] : ## History of SLE/RA?? - No classical findings for CTDs  - used to follow up with Dr. Lauren Moss - She was on HCQ/SSZ, was started on Rinvoq about 3 years ago (unclear indication)  - Rinvoq held due to UTI  - last serologies from [facility] in 4/2024 with normal dsDNA/C3/C4/RF/CCP - Recent serologies including JEROME, dsDNA, c3/c4, ANGEL, RF/CCP neg, APS serologies   - Currently on HCQ daily, will stop as there is no evidence for systemic autoimmune disease   ## Chronic neutropenia and thrombocytopenia - Bone marrow biopsy from [facility] on 3/30/21 showed ''hypercellular bone marrow with megakaryocytic hyperplasia, no MDS, No blasts or evidence of lymphoma''  - Cytogenetics normal  -was told that was the result of immunosuppression? however still had thrombocytopenia and leukopenia in 6/28/24  -also with mild splenomegaly  # Trigger fingers in both hands - Steroid injections done for right 2nd and left 3rd finger   ## Fibromyalgia, polyarthralgia -suspect component of OA -no active synovitis seen on the exam, doubt inflammatory arthritis component  Plan 1. Order labs  2. Hematology referral for cytopenia 3. Will stop HCQ 4. Refer to PT for neck. shoulder pains 5. Start cymbalta 30 mg daily   RTC in 3 months  D/w Dr. Terrell Pollard MD Rheumatology Fellow PGY-5

## 2024-08-18 NOTE — PHYSICAL EXAM
[General Appearance - Alert] : alert [General Appearance - In No Acute Distress] : in no acute distress [Neck Appearance] : the appearance of the neck was normal [Neck Cervical Mass (___cm)] : no neck mass was observed [Jugular Venous Distention Increased] : there was no jugular-venous distention [Thyroid Diffuse Enlargement] : the thyroid was not enlarged [Thyroid Nodule] : there were no palpable thyroid nodules [Auscultation Breath Sounds / Voice Sounds] : lungs were clear to auscultation bilaterally [Heart Rate And Rhythm] : heart rate was normal and rhythm regular [Heart Sounds] : normal S1 and S2 [Heart Sounds Gallop] : no gallops [Murmurs] : no murmurs [Heart Sounds Pericardial Friction Rub] : no pericardial rub [Bowel Sounds] : normal bowel sounds [Abdomen Soft] : soft [Abdomen Tenderness] : non-tender [Abdomen Mass (___ Cm)] : no abdominal mass palpated [Abnormal Walk] : normal gait [Nail Clubbing] : no clubbing  or cyanosis of the fingernails [Musculoskeletal - Swelling] : no joint swelling seen [Motor Tone] : muscle strength and tone were normal [Skin Color & Pigmentation] : normal skin color and pigmentation [Skin Turgor] : normal skin turgor [] : no rash [Deep Tendon Reflexes (DTR)] : deep tendon reflexes were 2+ and symmetric [Sensation] : the sensory exam was normal to light touch and pinprick [No Focal Deficits] : no focal deficits [Oriented To Time, Place, And Person] : oriented to person, place, and time [Impaired Insight] : insight and judgment were intact [Affect] : the affect was normal [FreeTextEntry1] : Tenderness on upper arm, neck, forearm, upper legs, and lower back. No synovitis

## 2024-09-13 ENCOUNTER — APPOINTMENT (OUTPATIENT)
Dept: ORTHOPEDIC SURGERY | Facility: CLINIC | Age: 64
End: 2024-09-13
Payer: MEDICARE

## 2024-09-13 VITALS — WEIGHT: 185 LBS | HEIGHT: 62 IN | BODY MASS INDEX: 34.04 KG/M2

## 2024-09-13 DIAGNOSIS — M17.0 BILATERAL PRIMARY OSTEOARTHRITIS OF KNEE: ICD-10-CM

## 2024-09-13 PROCEDURE — 20610 DRAIN/INJ JOINT/BURSA W/O US: CPT | Mod: RT

## 2024-09-13 PROCEDURE — 99214 OFFICE O/P EST MOD 30 MIN: CPT | Mod: 25

## 2024-09-26 DIAGNOSIS — M17.12 UNILATERAL PRIMARY OSTEOARTHRITIS, LEFT KNEE: ICD-10-CM

## 2024-10-08 ENCOUNTER — OUTPATIENT (OUTPATIENT)
Dept: OUTPATIENT SERVICES | Facility: HOSPITAL | Age: 64
LOS: 1 days | End: 2024-10-08
Payer: COMMERCIAL

## 2024-10-08 VITALS
TEMPERATURE: 98 F | HEART RATE: 74 BPM | HEIGHT: 66 IN | RESPIRATION RATE: 16 BRPM | DIASTOLIC BLOOD PRESSURE: 86 MMHG | SYSTOLIC BLOOD PRESSURE: 131 MMHG | WEIGHT: 188.05 LBS | OXYGEN SATURATION: 99 %

## 2024-10-08 DIAGNOSIS — M17.12 UNILATERAL PRIMARY OSTEOARTHRITIS, LEFT KNEE: ICD-10-CM

## 2024-10-08 DIAGNOSIS — Z01.818 ENCOUNTER FOR OTHER PREPROCEDURAL EXAMINATION: ICD-10-CM

## 2024-10-08 DIAGNOSIS — Z98.42 CATARACT EXTRACTION STATUS, LEFT EYE: Chronic | ICD-10-CM

## 2024-10-08 LAB
ALBUMIN SERPL ELPH-MCNC: 4.4 G/DL — SIGNIFICANT CHANGE UP (ref 3.3–5)
ALP SERPL-CCNC: 101 U/L — SIGNIFICANT CHANGE UP (ref 40–120)
ALT FLD-CCNC: 16 U/L — SIGNIFICANT CHANGE UP (ref 10–45)
ANION GAP SERPL CALC-SCNC: 12 MMOL/L — SIGNIFICANT CHANGE UP (ref 5–17)
AST SERPL-CCNC: 16 U/L — SIGNIFICANT CHANGE UP (ref 10–40)
BILIRUB SERPL-MCNC: 0.2 MG/DL — SIGNIFICANT CHANGE UP (ref 0.2–1.2)
BLD GP AB SCN SERPL QL: NEGATIVE — SIGNIFICANT CHANGE UP
BUN SERPL-MCNC: 16 MG/DL — SIGNIFICANT CHANGE UP (ref 7–23)
CALCIUM SERPL-MCNC: 9.6 MG/DL — SIGNIFICANT CHANGE UP (ref 8.4–10.5)
CHLORIDE SERPL-SCNC: 103 MMOL/L — SIGNIFICANT CHANGE UP (ref 96–108)
CO2 SERPL-SCNC: 24 MMOL/L — SIGNIFICANT CHANGE UP (ref 22–31)
CREAT SERPL-MCNC: 0.54 MG/DL — SIGNIFICANT CHANGE UP (ref 0.5–1.3)
EGFR: 102 ML/MIN/1.73M2 — SIGNIFICANT CHANGE UP
GLUCOSE SERPL-MCNC: 110 MG/DL — HIGH (ref 70–99)
HCT VFR BLD CALC: 39.3 % — SIGNIFICANT CHANGE UP (ref 34.5–45)
HCV AB S/CO SERPL IA: 91.21 S/CO — HIGH
HCV AB SERPL-IMP: REACTIVE
HGB BLD-MCNC: 12.7 G/DL — SIGNIFICANT CHANGE UP (ref 11.5–15.5)
MCHC RBC-ENTMCNC: 27.9 PG — SIGNIFICANT CHANGE UP (ref 27–34)
MCHC RBC-ENTMCNC: 32.3 GM/DL — SIGNIFICANT CHANGE UP (ref 32–36)
MCV RBC AUTO: 86.4 FL — SIGNIFICANT CHANGE UP (ref 80–100)
NRBC # BLD: 0 /100 WBCS — SIGNIFICANT CHANGE UP (ref 0–0)
PLATELET # BLD AUTO: 87 K/UL — LOW (ref 150–400)
POTASSIUM SERPL-MCNC: 4 MMOL/L — SIGNIFICANT CHANGE UP (ref 3.5–5.3)
POTASSIUM SERPL-SCNC: 4 MMOL/L — SIGNIFICANT CHANGE UP (ref 3.5–5.3)
PROT SERPL-MCNC: 7.8 G/DL — SIGNIFICANT CHANGE UP (ref 6–8.3)
RBC # BLD: 4.55 M/UL — SIGNIFICANT CHANGE UP (ref 3.8–5.2)
RBC # FLD: 14.6 % — HIGH (ref 10.3–14.5)
RH IG SCN BLD-IMP: NEGATIVE — SIGNIFICANT CHANGE UP
SODIUM SERPL-SCNC: 139 MMOL/L — SIGNIFICANT CHANGE UP (ref 135–145)
WBC # BLD: 3.8 K/UL — SIGNIFICANT CHANGE UP (ref 3.8–10.5)
WBC # FLD AUTO: 3.8 K/UL — SIGNIFICANT CHANGE UP (ref 3.8–10.5)

## 2024-10-08 PROCEDURE — 86900 BLOOD TYPING SEROLOGIC ABO: CPT

## 2024-10-08 PROCEDURE — G0463: CPT

## 2024-10-08 PROCEDURE — 87641 MR-STAPH DNA AMP PROBE: CPT

## 2024-10-08 PROCEDURE — 83036 HEMOGLOBIN GLYCOSYLATED A1C: CPT

## 2024-10-08 PROCEDURE — 86901 BLOOD TYPING SEROLOGIC RH(D): CPT

## 2024-10-08 PROCEDURE — 85027 COMPLETE CBC AUTOMATED: CPT

## 2024-10-08 PROCEDURE — 80053 COMPREHEN METABOLIC PANEL: CPT

## 2024-10-08 PROCEDURE — 86850 RBC ANTIBODY SCREEN: CPT

## 2024-10-08 PROCEDURE — 87640 STAPH A DNA AMP PROBE: CPT

## 2024-10-08 PROCEDURE — 87521 HEPATITIS C PROBE&RVRS TRNSC: CPT

## 2024-10-08 PROCEDURE — 73700 CT LOWER EXTREMITY W/O DYE: CPT | Mod: 26,LT,MC

## 2024-10-08 PROCEDURE — 73700 CT LOWER EXTREMITY W/O DYE: CPT | Mod: MC

## 2024-10-08 PROCEDURE — 86803 HEPATITIS C AB TEST: CPT

## 2024-10-08 NOTE — H&P PST ADULT - PROBLEM SELECTOR PLAN 1
left total knee arthroplasty with mauro robot  PST labs send  preprocedure surgical scrub instructions discussed with pt and daughter  medical laura left total knee arthroplasty with mauro robot  PST labs send  preprocedure surgical scrub instructions discussed with pt and daughter  medical eval pending

## 2024-10-08 NOTE — H&P PST ADULT - ASSESSMENT
DASI score: 5.7   DASIactivity: walking stairs house chores w/o cp/SOB limited due to pain  loose teeth or dentures: denies   airway mp2    CAPRINI SCORE    AGE RELATED RISK FACTORS                                                             [ ] Age 41-60 years                                            (1 Point)  [ ] Age: 61-74 years                                           (2 Points)                 [ ] Age= 75 years                                                (3 Points)             DISEASE RELATED RISK FACTORS                                                       [ ] Edema in the lower extremities                 (1 Point)                     [ ] Varicose veins                                               (1 Point)                                 [ ] BMI > 25 Kg/m2                                            (1 Point)                                  [ ] Serious infection (ie PNA)                            (1 Point)                     [ ] Lung disease ( COPD, Emphysema)            (1 Point)                                                                          [ ] Acute myocardial infarction                         (1 Point)                  [ ] Congestive heart failure (in the previous month)  (1 Point)         [ ] Inflammatory bowel disease                            (1 Point)                  [ ] Central venous access, PICC or Port               (2 points)       (within the last month)                                                                [ ] Stroke (in the previous month)                        (5 Points)    [ ] Previous or present malignancy                       (2 points)                                                                                                                                                         HEMATOLOGY RELATED FACTORS                                                         [ ] Prior episodes of VTE                                     (3 Points)                     [ ] Positive family history for VTE                      (3 Points)                  [ ] Prothrombin 44772 A                                     (3 Points)                     [ ] Factor V Leiden                                                (3 Points)                        [ ] Lupus anticoagulants                                      (3 Points)                                                           [ ] Anticardiolipin antibodies                              (3 Points)                                                       [ ] High homocysteine in the blood                   (3 Points)                                             [ ] Other congenital or acquired thrombophilia      (3 Points)                                                [ ] Heparin induced thrombocytopenia                  (3 Points)                                        MOBILITY RELATED FACTORS  [ ] Bed rest                                                         (1 Point)  [ ] Plaster cast                                                    (2 points)  [ ] Bed bound for more than 72 hours           (2 Points)    GENDER SPECIFIC FACTORS  [ ] Pregnancy or had a baby within the last month   (1 Point)  [ ] Post-partum < 6 weeks                                   (1 Point)  [ ] Hormonal therapy  or oral contraception   (1 Point)  [ ] History of pregnancy complications              (1 point)  [ ] Unexplained or recurrent              (1 Point)    OTHER RISK FACTORS                                           (1 Point)  [ ] BMI >40, smoking, diabetes requiring insulin, chemotherapy  blood transfusions and length of surgery over 2 hours    SURGERY RELATED RISK FACTORS  [ ]  Section within the last month     (1 Point)  [ ] Minor surgery                                                  (1 Point)  [ ] Arthroscopic surgery                                       (2 Points)  [ ] Planned major surgery lasting more            (2 Points)      than 45 minutes     [ ] Elective hip or knee joint replacement       (5 points)       surgery                                                TRAUMA RELATED RISK FACTORS  [ ] Fracture of the hip, pelvis, or leg                       (5 Points)  [ ] Spinal cord injury resulting in paralysis             (5 points)       (in the previous month)    [ ] Paralysis  (less than 1 month)                             (5 Points)  [ ] Multiple Trauma within 1 month                        (5 Points)    Total Score [        ]    Caprini Score 0-2: Low Risk, NO VTE prophylaxis required for most patients, encourage ambulation  Caprini Score 3-6: Moderate Risk , pharmacologic VTE prophylaxis is indicated for most patients (in the absence of contraindications)  Caprini Score Greater than or =7: High risk, pharmocologic VTE prophylaxis indicated for most patients (in the absence of contraindications)                                 DASI score: 5.7   DASIactivity: walking stairs house chores w/o cp/SOB limited due to pain  loose teeth or dentures: denies   airway mp2    CAPRINI SCORE    AGE RELATED RISK FACTORS                                                             [ ] Age 41-60 years                                            (1 Point)  [x ] Age: 61-74 years                                           (2 Points)                 [ ] Age= 75 years                                                (3 Points)             DISEASE RELATED RISK FACTORS                                                       [ ] Edema in the lower extremities                 (1 Point)                     [ ] Varicose veins                                               (1 Point)                                 [x ] BMI > 25 Kg/m2                                            (1 Point)                                  [ ] Serious infection (ie PNA)                            (1 Point)                     [ ] Lung disease ( COPD, Emphysema)            (1 Point)                                                                          [ ] Acute myocardial infarction                         (1 Point)                  [ ] Congestive heart failure (in the previous month)  (1 Point)         [ ] Inflammatory bowel disease                            (1 Point)                  [ ] Central venous access, PICC or Port               (2 points)       (within the last month)                                                                [ ] Stroke (in the previous month)                        (5 Points)    [ ] Previous or present malignancy                       (2 points)                                                                                                                                                         HEMATOLOGY RELATED FACTORS                                                         [ ] Prior episodes of VTE                                     (3 Points)                     [ ] Positive family history for VTE                      (3 Points)                  [ ] Prothrombin 92590 A                                     (3 Points)                     [ ] Factor V Leiden                                                (3 Points)                        [ ] Lupus anticoagulants                                      (3 Points)                                                           [ ] Anticardiolipin antibodies                              (3 Points)                                                       [ ] High homocysteine in the blood                   (3 Points)                                             [ ] Other congenital or acquired thrombophilia      (3 Points)                                                [ ] Heparin induced thrombocytopenia                  (3 Points)                                        MOBILITY RELATED FACTORS  [ ] Bed rest                                                         (1 Point)  [ ] Plaster cast                                                    (2 points)  [ ] Bed bound for more than 72 hours           (2 Points)    GENDER SPECIFIC FACTORS  [ ] Pregnancy or had a baby within the last month   (1 Point)  [ ] Post-partum < 6 weeks                                   (1 Point)  [ ] Hormonal therapy  or oral contraception   (1 Point)  [ ] History of pregnancy complications              (1 point)  [ ] Unexplained or recurrent              (1 Point)    OTHER RISK FACTORS                                           (1 Point)  [ ] BMI >40, smoking, diabetes requiring insulin, chemotherapy  blood transfusions and length of surgery over 2 hours    SURGERY RELATED RISK FACTORS  [ ]  Section within the last month     (1 Point)  [ ] Minor surgery                                                  (1 Point)  [ ] Arthroscopic surgery                                       (2 Points)  [ ] Planned major surgery lasting more            (2 Points)      than 45 minutes     x[ ] Elective hip or knee joint replacement       (5 points)       surgery                                                TRAUMA RELATED RISK FACTORS  [ ] Fracture of the hip, pelvis, or leg                       (5 Points)  [ ] Spinal cord injury resulting in paralysis             (5 points)       (in the previous month)    [ ] Paralysis  (less than 1 month)                             (5 Points)  [ ] Multiple Trauma within 1 month                        (5 Points)    Total Score [   8     ]    Caprini Score 0-2: Low Risk, NO VTE prophylaxis required for most patients, encourage ambulation  Caprini Score 3-6: Moderate Risk , pharmacologic VTE prophylaxis is indicated for most patients (in the absence of contraindications)  Caprini Score Greater than or =7: High risk, pharmocologic VTE prophylaxis indicated for most patients (in the absence of contraindications)

## 2024-10-08 NOTE — H&P PST ADULT - NSICDXPASTMEDICALHX_GEN_ALL_CORE_FT
PAST MEDICAL HISTORY:  2019 novel coronavirus disease (COVID-19)     E. coli UTI (urinary tract infection)     H/O fibromyalgia     H/O neutropenia     H/O rheumatoid arthritis     H/O splenomegaly     H/O thrombocytopenia     Primary osteoarthritis of left knee     Systemic lupus erythematosus      PAST MEDICAL HISTORY:  2019 novel coronavirus disease (COVID-19)     E. coli UTI (urinary tract infection)     H/O fibromyalgia     H/O neutropenia     H/O rheumatoid arthritis     H/O splenomegaly     H/O thrombocytopenia     Language barrier to communication     Primary osteoarthritis of left knee     Systemic lupus erythematosus

## 2024-10-08 NOTE — H&P PST ADULT - HISTORY OF PRESENT ILLNESS
64 year old Turkmen speaking female with PMH of SLE/RA , chronic neutropenia/ thrombocytopenia x > 10 years ( Baseline PLT 90s as per daughter, seen Hematologist in the past not recently), Arthralgia with left knee pain x 1 year uses cane  worsening planned for Left total knee arthroplasty with mauro Robot on 10/26/2024       Denies any hx of hepatitis  64 year old Kiswahili speaking female with PMH of SLE/RA , chronic neutropenia/ thrombocytopenia x > 10 years ( Baseline PLT 90s as per daughter, seen Hematologist in the past not recently), Arthralgia with bilateral knee pain left  worse than right, left knee pain x 1 year uses cane  worsening planned for Left total knee arthroplasty with mauro Robot on 10/26/2024       Denies any hx of hepatitis   ***T&S hx of thrombocytopenia

## 2024-10-09 PROBLEM — M32.9 SYSTEMIC LUPUS ERYTHEMATOSUS, UNSPECIFIED: Chronic | Status: INACTIVE | Noted: 2024-06-03 | Resolved: 2024-10-08

## 2024-10-09 LAB
A1C WITH ESTIMATED AVERAGE GLUCOSE RESULT: 5.5 % — SIGNIFICANT CHANGE UP (ref 4–5.6)
ESTIMATED AVERAGE GLUCOSE: 111 MG/DL — SIGNIFICANT CHANGE UP (ref 68–114)
HCV RNA FLD QL NAA+PROBE: SIGNIFICANT CHANGE UP
HCV RNA SPEC QL PROBE+SIG AMP: SIGNIFICANT CHANGE UP
MRSA PCR RESULT.: SIGNIFICANT CHANGE UP
S AUREUS DNA NOSE QL NAA+PROBE: SIGNIFICANT CHANGE UP

## 2024-10-26 ENCOUNTER — APPOINTMENT (OUTPATIENT)
Dept: ORTHOPEDIC SURGERY | Facility: HOSPITAL | Age: 64
End: 2024-10-26

## 2024-10-26 ENCOUNTER — TRANSCRIPTION ENCOUNTER (OUTPATIENT)
Age: 64
End: 2024-10-26

## 2024-10-26 ENCOUNTER — INPATIENT (INPATIENT)
Facility: HOSPITAL | Age: 64
LOS: 3 days | Discharge: HOME | DRG: 470 | End: 2024-10-30
Attending: ORTHOPAEDIC SURGERY | Admitting: ORTHOPAEDIC SURGERY
Payer: COMMERCIAL

## 2024-10-26 VITALS
DIASTOLIC BLOOD PRESSURE: 84 MMHG | HEIGHT: 65.98 IN | HEART RATE: 82 BPM | RESPIRATION RATE: 16 BRPM | SYSTOLIC BLOOD PRESSURE: 125 MMHG | WEIGHT: 188.05 LBS | OXYGEN SATURATION: 99 % | TEMPERATURE: 98 F

## 2024-10-26 DIAGNOSIS — M17.12 UNILATERAL PRIMARY OSTEOARTHRITIS, LEFT KNEE: ICD-10-CM

## 2024-10-26 DIAGNOSIS — Z98.42 CATARACT EXTRACTION STATUS, LEFT EYE: Chronic | ICD-10-CM

## 2024-10-26 LAB — GLUCOSE BLDC GLUCOMTR-MCNC: 104 MG/DL — HIGH (ref 70–99)

## 2024-10-26 PROCEDURE — 73560 X-RAY EXAM OF KNEE 1 OR 2: CPT | Mod: 26,LT

## 2024-10-26 PROCEDURE — S2900 ROBOTIC SURGICAL SYSTEM: CPT | Mod: NC

## 2024-10-26 PROCEDURE — 20985 CPTR-ASST DIR MS PX: CPT

## 2024-10-26 PROCEDURE — 0055T BONE SRGRY CMPTR CT/MRI IMAG: CPT

## 2024-10-26 PROCEDURE — 27447 TOTAL KNEE ARTHROPLASTY: CPT | Mod: LT

## 2024-10-26 DEVICE — INSERT TIB BEARING TRIATHLON CS X3 SZ 3 9MM: Type: IMPLANTABLE DEVICE | Site: LEFT | Status: FUNCTIONAL

## 2024-10-26 DEVICE — TRIATHLON TIBIAL COMP SZ 3: Type: IMPLANTABLE DEVICE | Site: LEFT | Status: FUNCTIONAL

## 2024-10-26 DEVICE — MAKO BONE PIN 3.2MM X 140MM: Type: IMPLANTABLE DEVICE | Site: LEFT | Status: FUNCTIONAL

## 2024-10-26 DEVICE — COMP FEM CR CMNTLSS BEADED W/ PA SZ 3 LT: Type: IMPLANTABLE DEVICE | Site: LEFT | Status: FUNCTIONAL

## 2024-10-26 DEVICE — MAKO BONE PIN 3.2MM X 110MM: Type: IMPLANTABLE DEVICE | Site: LEFT | Status: FUNCTIONAL

## 2024-10-26 DEVICE — MAKO BONE PIN 4MM X 140MM: Type: IMPLANTABLE DEVICE | Site: LEFT | Status: FUNCTIONAL

## 2024-10-26 DEVICE — MAKO BONE PIN 4MM X 110MM: Type: IMPLANTABLE DEVICE | Site: LEFT | Status: FUNCTIONAL

## 2024-10-26 DEVICE — PATELLA ASYMM TRIATHLON SZ A 32X10MM: Type: IMPLANTABLE DEVICE | Site: LEFT | Status: FUNCTIONAL

## 2024-10-26 RX ORDER — TRAMADOL HYDROCHLORIDE 50 MG/1
50 TABLET, COATED ORAL ONCE
Refills: 0 | Status: DISCONTINUED | OUTPATIENT
Start: 2024-10-26 | End: 2024-10-26

## 2024-10-26 RX ORDER — ACETAMINOPHEN 500 MG
1000 TABLET ORAL ONCE
Refills: 0 | Status: COMPLETED | OUTPATIENT
Start: 2024-10-27 | End: 2024-10-27

## 2024-10-26 RX ORDER — LIDOCAINE HCL 60 MG/3 ML
0.2 SYRINGE (ML) INJECTION ONCE
Refills: 0 | Status: DISCONTINUED | OUTPATIENT
Start: 2024-10-26 | End: 2024-10-26

## 2024-10-26 RX ORDER — MAGNESIUM HYDROXIDE 1200 MG/15ML
30 SUSPENSION ORAL DAILY
Refills: 0 | Status: DISCONTINUED | OUTPATIENT
Start: 2024-10-26 | End: 2024-10-30

## 2024-10-26 RX ORDER — PANTOPRAZOLE SODIUM 40 MG/1
40 TABLET, DELAYED RELEASE ORAL ONCE
Refills: 0 | Status: COMPLETED | OUTPATIENT
Start: 2024-10-26 | End: 2024-10-26

## 2024-10-26 RX ORDER — HYDROXYCHLOROQUINE SULFATE 200 MG
200 TABLET ORAL
Refills: 0 | Status: DISCONTINUED | OUTPATIENT
Start: 2024-10-26 | End: 2024-10-30

## 2024-10-26 RX ORDER — HYDROMORPHONE HCL/0.9% NACL/PF 6 MG/30 ML
0.5 PATIENT CONTROLLED ANALGESIA SYRINGE INTRAVENOUS ONCE
Refills: 0 | Status: DISCONTINUED | OUTPATIENT
Start: 2024-10-26 | End: 2024-10-26

## 2024-10-26 RX ORDER — HYDROMORPHONE HCL/0.9% NACL/PF 6 MG/30 ML
0.5 PATIENT CONTROLLED ANALGESIA SYRINGE INTRAVENOUS ONCE
Refills: 0 | Status: COMPLETED | OUTPATIENT
Start: 2024-10-26 | End: 2024-11-02

## 2024-10-26 RX ORDER — SODIUM CHLORIDE 9 MG/ML
3 INJECTION, SOLUTION INTRAMUSCULAR; INTRAVENOUS; SUBCUTANEOUS EVERY 8 HOURS
Refills: 0 | Status: DISCONTINUED | OUTPATIENT
Start: 2024-10-26 | End: 2024-10-26

## 2024-10-26 RX ORDER — ACETAMINOPHEN 500 MG
1000 TABLET ORAL EVERY 8 HOURS
Refills: 0 | Status: DISCONTINUED | OUTPATIENT
Start: 2024-10-26 | End: 2024-10-30

## 2024-10-26 RX ORDER — OXYCODONE HYDROCHLORIDE 30 MG/1
10 TABLET ORAL
Refills: 0 | Status: DISCONTINUED | OUTPATIENT
Start: 2024-10-26 | End: 2024-10-30

## 2024-10-26 RX ORDER — CEFAZOLIN SODIUM 1 G
2000 VIAL (EA) INJECTION EVERY 8 HOURS
Refills: 0 | Status: COMPLETED | OUTPATIENT
Start: 2024-10-26 | End: 2024-10-27

## 2024-10-26 RX ORDER — SENNA 187 MG
2 TABLET ORAL AT BEDTIME
Refills: 0 | Status: DISCONTINUED | OUTPATIENT
Start: 2024-10-26 | End: 2024-10-30

## 2024-10-26 RX ORDER — DEXAMETHASONE 1.5 MG 1.5 MG/1
8 TABLET ORAL ONCE
Refills: 0 | Status: COMPLETED | OUTPATIENT
Start: 2024-10-27 | End: 2024-10-27

## 2024-10-26 RX ORDER — DULOXETINE HYDROCHLORIDE 30 MG/1
30 CAPSULE, DELAYED RELEASE ORAL DAILY
Refills: 0 | Status: DISCONTINUED | OUTPATIENT
Start: 2024-10-26 | End: 2024-10-30

## 2024-10-26 RX ORDER — CEFAZOLIN SODIUM 1 G
2000 VIAL (EA) INJECTION ONCE
Refills: 0 | Status: COMPLETED | OUTPATIENT
Start: 2024-10-26 | End: 2024-10-26

## 2024-10-26 RX ORDER — DULOXETINE HCL 20 MG
1 CAPSULE,DELAYED RELEASE (ENTERIC COATED) ORAL
Refills: 0 | DISCHARGE

## 2024-10-26 RX ORDER — PANTOPRAZOLE SODIUM 40 MG/1
40 TABLET, DELAYED RELEASE ORAL
Refills: 0 | Status: DISCONTINUED | OUTPATIENT
Start: 2024-10-26 | End: 2024-10-30

## 2024-10-26 RX ORDER — POLYETHYLENE GLYCOL 3350 17 G/17G
17 POWDER, FOR SOLUTION ORAL AT BEDTIME
Refills: 0 | Status: DISCONTINUED | OUTPATIENT
Start: 2024-10-26 | End: 2024-10-30

## 2024-10-26 RX ORDER — TRAMADOL HYDROCHLORIDE 50 MG/1
50 TABLET, COATED ORAL EVERY 6 HOURS
Refills: 0 | Status: DISCONTINUED | OUTPATIENT
Start: 2024-10-26 | End: 2024-10-30

## 2024-10-26 RX ORDER — ACETAMINOPHEN 500 MG
1000 TABLET ORAL ONCE
Refills: 0 | Status: COMPLETED | OUTPATIENT
Start: 2024-10-26 | End: 2024-10-26

## 2024-10-26 RX ORDER — OXYCODONE HYDROCHLORIDE 30 MG/1
5 TABLET ORAL
Refills: 0 | Status: DISCONTINUED | OUTPATIENT
Start: 2024-10-26 | End: 2024-10-30

## 2024-10-26 RX ORDER — HYDROMORPHONE HCL/0.9% NACL/PF 6 MG/30 ML
0.25 PATIENT CONTROLLED ANALGESIA SYRINGE INTRAVENOUS
Refills: 0 | Status: DISCONTINUED | OUTPATIENT
Start: 2024-10-26 | End: 2024-10-26

## 2024-10-26 RX ORDER — ASPIRIN/MAG CARB/ALUMINUM AMIN 325 MG
81 TABLET ORAL EVERY 12 HOURS
Refills: 0 | Status: DISCONTINUED | OUTPATIENT
Start: 2024-10-27 | End: 2024-10-30

## 2024-10-26 RX ORDER — CHLORHEXIDINE GLUCONATE 40 MG/ML
1 SOLUTION TOPICAL ONCE
Refills: 0 | Status: COMPLETED | OUTPATIENT
Start: 2024-10-26 | End: 2024-10-26

## 2024-10-26 RX ORDER — ONDANSETRON HYDROCHLORIDE 2 MG/ML
4 INJECTION, SOLUTION INTRAMUSCULAR; INTRAVENOUS EVERY 6 HOURS
Refills: 0 | Status: DISCONTINUED | OUTPATIENT
Start: 2024-10-26 | End: 2024-10-30

## 2024-10-26 RX ADMIN — OXYCODONE HYDROCHLORIDE 10 MILLIGRAM(S): 30 TABLET ORAL at 18:13

## 2024-10-26 RX ADMIN — PANTOPRAZOLE SODIUM 40 MILLIGRAM(S): 40 TABLET, DELAYED RELEASE ORAL at 11:43

## 2024-10-26 RX ADMIN — Medication 0.5 MILLIGRAM(S): at 15:32

## 2024-10-26 RX ADMIN — Medication 400 MILLIGRAM(S): at 21:34

## 2024-10-26 RX ADMIN — Medication 500 MILLILITER(S): at 21:34

## 2024-10-26 RX ADMIN — Medication 100 MILLIGRAM(S): at 21:34

## 2024-10-26 RX ADMIN — Medication 1000 MILLIGRAM(S): at 22:34

## 2024-10-26 RX ADMIN — Medication 0.25 MILLIGRAM(S): at 16:30

## 2024-10-26 RX ADMIN — Medication 0.25 MILLIGRAM(S): at 16:17

## 2024-10-26 RX ADMIN — SODIUM CHLORIDE 3 MILLILITER(S): 9 INJECTION, SOLUTION INTRAMUSCULAR; INTRAVENOUS; SUBCUTANEOUS at 10:05

## 2024-10-26 RX ADMIN — OXYCODONE HYDROCHLORIDE 10 MILLIGRAM(S): 30 TABLET ORAL at 19:00

## 2024-10-26 RX ADMIN — Medication 0.5 MILLIGRAM(S): at 15:05

## 2024-10-26 RX ADMIN — CHLORHEXIDINE GLUCONATE 1 APPLICATION(S): 40 SOLUTION TOPICAL at 09:00

## 2024-10-26 RX ADMIN — Medication 500 MILLILITER(S): at 14:51

## 2024-10-26 RX ADMIN — TRAMADOL HYDROCHLORIDE 50 MILLIGRAM(S): 50 TABLET, COATED ORAL at 11:42

## 2024-10-26 RX ADMIN — Medication 0.5 MILLIGRAM(S): at 15:45

## 2024-10-26 RX ADMIN — Medication 0.5 MILLIGRAM(S): at 14:51

## 2024-10-26 NOTE — PRE-ANESTHESIA EVALUATION ADULT - NSANTHDISPORD_GEN_ALL_CORE
From: Wilner Jacobson  To: Abrahan Rick,   Sent: 6/2/2022 9:24 PM EDT  Subject: Bone scan    Sharla Rick   I read in my chart that you want me to do a full NUC body scan. When and where should this take place. Also I see that i have several items flagged. Can you explain and let me know how to make better results.   Thank you  Wilner Jacobson   
PACU

## 2024-10-26 NOTE — BRIEF OPERATIVE NOTE - NSICDXBRIEFPROCEDURE_GEN_ALL_CORE_FT
PROCEDURES:  Arthroplasty, knee, total, robot-assisted, using Arsh system 26-Oct-2024 14:07:32  Donnie Massey

## 2024-10-26 NOTE — PATIENT PROFILE ADULT - NSPROPTRIGHTREPNAME_GEN_A__NUR
Received report from ALEX Chan. Pt currently laying in bed, AOx 4 with no complaints. Wife is at the bedside. Fluids still infusing  
Davie Allen *daughter

## 2024-10-26 NOTE — PRE-ANESTHESIA EVALUATION ADULT - NSANTHPMHFT_GEN_ALL_CORE
chart and consultant notes reviewed. no hx sig cv/pulm dz - states et  1 flight, limited by ortho pain- no cp/sob . chronic low plts, 50-90's over past yr. hcv

## 2024-10-27 LAB
ANION GAP SERPL CALC-SCNC: 12 MMOL/L — SIGNIFICANT CHANGE UP (ref 5–17)
BUN SERPL-MCNC: 11 MG/DL — SIGNIFICANT CHANGE UP (ref 7–23)
CALCIUM SERPL-MCNC: 9 MG/DL — SIGNIFICANT CHANGE UP (ref 8.4–10.5)
CHLORIDE SERPL-SCNC: 105 MMOL/L — SIGNIFICANT CHANGE UP (ref 96–108)
CO2 SERPL-SCNC: 21 MMOL/L — LOW (ref 22–31)
CREAT SERPL-MCNC: 0.58 MG/DL — SIGNIFICANT CHANGE UP (ref 0.5–1.3)
EGFR: 100 ML/MIN/1.73M2 — SIGNIFICANT CHANGE UP
GLUCOSE SERPL-MCNC: 133 MG/DL — HIGH (ref 70–99)
HCT VFR BLD CALC: 31.6 % — LOW (ref 34.5–45)
HGB BLD-MCNC: 10.2 G/DL — LOW (ref 11.5–15.5)
MCHC RBC-ENTMCNC: 27.7 PG — SIGNIFICANT CHANGE UP (ref 27–34)
MCHC RBC-ENTMCNC: 32.3 GM/DL — SIGNIFICANT CHANGE UP (ref 32–36)
MCV RBC AUTO: 85.9 FL — SIGNIFICANT CHANGE UP (ref 80–100)
NRBC # BLD: 0 /100 WBCS — SIGNIFICANT CHANGE UP (ref 0–0)
PLATELET # BLD AUTO: 72 K/UL — LOW (ref 150–400)
POTASSIUM SERPL-MCNC: 3.6 MMOL/L — SIGNIFICANT CHANGE UP (ref 3.5–5.3)
POTASSIUM SERPL-SCNC: 3.6 MMOL/L — SIGNIFICANT CHANGE UP (ref 3.5–5.3)
RBC # BLD: 3.68 M/UL — LOW (ref 3.8–5.2)
RBC # FLD: 14.2 % — SIGNIFICANT CHANGE UP (ref 10.3–14.5)
SODIUM SERPL-SCNC: 138 MMOL/L — SIGNIFICANT CHANGE UP (ref 135–145)
WBC # BLD: 6.28 K/UL — SIGNIFICANT CHANGE UP (ref 3.8–10.5)
WBC # FLD AUTO: 6.28 K/UL — SIGNIFICANT CHANGE UP (ref 3.8–10.5)

## 2024-10-27 RX ORDER — CYCLOBENZAPRINE HYDROCHLORIDE 30 MG/1
5 CAPSULE, EXTENDED RELEASE ORAL THREE TIMES A DAY
Refills: 0 | Status: DISCONTINUED | OUTPATIENT
Start: 2024-10-27 | End: 2024-10-30

## 2024-10-27 RX ORDER — MELATONIN 5 MG
5 TABLET ORAL AT BEDTIME
Refills: 0 | Status: DISCONTINUED | OUTPATIENT
Start: 2024-10-27 | End: 2024-10-30

## 2024-10-27 RX ORDER — HYDROMORPHONE HCL/0.9% NACL/PF 6 MG/30 ML
0.5 PATIENT CONTROLLED ANALGESIA SYRINGE INTRAVENOUS ONCE
Refills: 0 | Status: DISCONTINUED | OUTPATIENT
Start: 2024-10-27 | End: 2024-10-27

## 2024-10-27 RX ORDER — LIDOCAINE HYDROCHLORIDE 40 MG/ML
1 SOLUTION TOPICAL DAILY
Refills: 0 | Status: DISCONTINUED | OUTPATIENT
Start: 2024-10-27 | End: 2024-10-30

## 2024-10-27 RX ADMIN — Medication 200 MILLIGRAM(S): at 17:38

## 2024-10-27 RX ADMIN — TRAMADOL HYDROCHLORIDE 50 MILLIGRAM(S): 50 TABLET, COATED ORAL at 02:59

## 2024-10-27 RX ADMIN — Medication 1000 MILLIGRAM(S): at 21:00

## 2024-10-27 RX ADMIN — LIDOCAINE HYDROCHLORIDE 1 PATCH: 40 SOLUTION TOPICAL at 19:19

## 2024-10-27 RX ADMIN — CYCLOBENZAPRINE HYDROCHLORIDE 5 MILLIGRAM(S): 30 CAPSULE, EXTENDED RELEASE ORAL at 06:30

## 2024-10-27 RX ADMIN — DEXAMETHASONE 1.5 MG 101.6 MILLIGRAM(S): 1.5 TABLET ORAL at 05:01

## 2024-10-27 RX ADMIN — Medication 400 MILLIGRAM(S): at 15:38

## 2024-10-27 RX ADMIN — OXYCODONE HYDROCHLORIDE 10 MILLIGRAM(S): 30 TABLET ORAL at 04:45

## 2024-10-27 RX ADMIN — OXYCODONE HYDROCHLORIDE 10 MILLIGRAM(S): 30 TABLET ORAL at 07:50

## 2024-10-27 RX ADMIN — OXYCODONE HYDROCHLORIDE 10 MILLIGRAM(S): 30 TABLET ORAL at 20:14

## 2024-10-27 RX ADMIN — PANTOPRAZOLE SODIUM 40 MILLIGRAM(S): 40 TABLET, DELAYED RELEASE ORAL at 05:00

## 2024-10-27 RX ADMIN — Medication 100 MILLIGRAM(S): at 05:01

## 2024-10-27 RX ADMIN — Medication 1000 MILLIGRAM(S): at 16:38

## 2024-10-27 RX ADMIN — OXYCODONE HYDROCHLORIDE 10 MILLIGRAM(S): 30 TABLET ORAL at 21:14

## 2024-10-27 RX ADMIN — Medication 75 MILLILITER(S): at 15:17

## 2024-10-27 RX ADMIN — OXYCODONE HYDROCHLORIDE 10 MILLIGRAM(S): 30 TABLET ORAL at 11:52

## 2024-10-27 RX ADMIN — TRAMADOL HYDROCHLORIDE 50 MILLIGRAM(S): 50 TABLET, COATED ORAL at 03:38

## 2024-10-27 RX ADMIN — Medication 81 MILLIGRAM(S): at 05:00

## 2024-10-27 RX ADMIN — OXYCODONE HYDROCHLORIDE 10 MILLIGRAM(S): 30 TABLET ORAL at 08:50

## 2024-10-27 RX ADMIN — Medication 1000 MILLIGRAM(S): at 04:45

## 2024-10-27 RX ADMIN — OXYCODONE HYDROCHLORIDE 5 MILLIGRAM(S): 30 TABLET ORAL at 00:13

## 2024-10-27 RX ADMIN — Medication 200 MILLIGRAM(S): at 05:01

## 2024-10-27 RX ADMIN — OXYCODONE HYDROCHLORIDE 10 MILLIGRAM(S): 30 TABLET ORAL at 15:37

## 2024-10-27 RX ADMIN — Medication 81 MILLIGRAM(S): at 17:37

## 2024-10-27 RX ADMIN — OXYCODONE HYDROCHLORIDE 5 MILLIGRAM(S): 30 TABLET ORAL at 00:43

## 2024-10-27 RX ADMIN — DULOXETINE HYDROCHLORIDE 30 MILLIGRAM(S): 30 CAPSULE, DELAYED RELEASE ORAL at 12:11

## 2024-10-27 RX ADMIN — Medication 400 MILLIGRAM(S): at 04:05

## 2024-10-27 RX ADMIN — Medication 1000 MILLIGRAM(S): at 22:00

## 2024-10-27 RX ADMIN — Medication 0.5 MILLIGRAM(S): at 12:12

## 2024-10-27 RX ADMIN — OXYCODONE HYDROCHLORIDE 10 MILLIGRAM(S): 30 TABLET ORAL at 04:06

## 2024-10-27 RX ADMIN — OXYCODONE HYDROCHLORIDE 10 MILLIGRAM(S): 30 TABLET ORAL at 10:52

## 2024-10-27 RX ADMIN — Medication 0.5 MILLIGRAM(S): at 13:12

## 2024-10-27 RX ADMIN — LIDOCAINE HYDROCHLORIDE 1 PATCH: 40 SOLUTION TOPICAL at 12:11

## 2024-10-27 RX ADMIN — Medication 500 MILLILITER(S): at 05:01

## 2024-10-27 RX ADMIN — OXYCODONE HYDROCHLORIDE 10 MILLIGRAM(S): 30 TABLET ORAL at 16:37

## 2024-10-27 NOTE — PHYSICAL THERAPY INITIAL EVALUATION ADULT - GAIT DEVIATIONS NOTED, PT EVAL
decreased janna/decreased velocity of limb motion/decreased step length/decreased weight-shifting ability

## 2024-10-27 NOTE — PHYSICAL THERAPY INITIAL EVALUATION ADULT - ADDITIONAL COMMENTS
Pt lives in an apt with dtr with two  flight of steps to enter. Pt was Ind with all ADLs and amb with SC. Pt owns RW

## 2024-10-27 NOTE — OCCUPATIONAL THERAPY INITIAL EVALUATION ADULT - LLE MMT, REHAB EVAL
69yo M with PMH of Parkinson's, Dementia, HTN, HLD, and Chronic Pressure Ulcers p/w respiratory failure and found to have COVID/PNA. Transitioned to inpatient hospice for management of dyspnea at end of life due to respiratory failure from COVID and dementia.    ·	Call [facility] ([phone]) for any issues 24/7 regarding symptom management.  ·	Call [facility] ([phone]) for symptom management or to request interdisciplinary team intervention (RN/CM, SW, ) for patient/family.  
3-/5 hip and knee, 4/5 ankle

## 2024-10-27 NOTE — PROGRESS NOTE ADULT - SUBJECTIVE AND OBJECTIVE BOX
Post op Day [1]    Patient resting without complaints.  No chest pain, SOB, N/V.    T(C): 37 (10-27-24 @ 08:06), Max: 37 (10-27-24 @ 00:02)  HR: 77 (10-27-24 @ 08:06) (69 - 88)  BP: 129/75 (10-27-24 @ 08:06) (100/65 - 143/68)  RR: 17 (10-27-24 @ 08:06) (16 - 18)  SpO2: 96% (10-27-24 @ 08:06) (93% - 100%)      Exam:  Alert and Oriented, No Acute Distress  Cardiac: Normal S1 & S2, RRR, No murmurs, rubs or gallops appreciated.  Pulmonary: 18bpm, normal breathing effort, no retractions, diminished lung sounds appreciated.  Bronchial/Vesicular lungs sounds appreciated throughout all lung lobes.  Lower Extremities: L Knee  Dressing: C/D/I w/ Aquacel  Calves Soft, Non-tender bilaterally  +PF/DF/EHL/FHL  SILT  +DP Pulse         10-27    138  |  105  |  11  ----------------------------<  133[H]  3.6   |  21[L]  |  0.58    Ca    9.0      27 Oct 2024 07:24

## 2024-10-27 NOTE — PHYSICAL THERAPY INITIAL EVALUATION ADULT - PLANNED THERAPY INTERVENTIONS, PT EVAL
stair negotiation: GOAL: Pt will be able to negotiate 20 steps +HR independently with step to pattern in 2 weeks./bed mobility training/gait training/transfer training

## 2024-10-27 NOTE — OCCUPATIONAL THERAPY INITIAL EVALUATION ADULT - LIVES WITH, PROFILE
Lives with 2 daughters in a pvt home. 2 flights to living level. (I) ADLs and functional transfers. Uses straight cane. Stall shower. (-) /children

## 2024-10-27 NOTE — OCCUPATIONAL THERAPY INITIAL EVALUATION ADULT - PERTINENT HX OF CURRENT PROBLEM, REHAB EVAL
64 year old Georgian speaking female with PMH of SLE/RA , chronic neutropenia/ thrombocytopenia x > 10 years ( Baseline PLT 90s as per daughter, seen Hematologist in the past not recently), Arthralgia with bilateral knee pain left  worse than right, left knee pain x 1 year uses cane  worsening planned for Left total knee arthroplasty with mauro Robot on 10/26/2024

## 2024-10-27 NOTE — OCCUPATIONAL THERAPY INITIAL EVALUATION ADULT - RANGE OF MOTION EXAMINATION, LOWER EXTREMITY
Left LE Active Assistive ROM was WFL (within functional limits)/Right LE Active ROM was WNL(within normal limits)

## 2024-10-27 NOTE — OCCUPATIONAL THERAPY INITIAL EVALUATION ADULT - DIAGNOSIS, OT EVAL
Patient with deficits in ADL status and functional mobility due to pain, impaired balance, strength, ROM, coordination No

## 2024-10-27 NOTE — PHYSICAL THERAPY INITIAL EVALUATION ADULT - WEIGHT-BEARING RESTRICTIONS: GAIT, REHAB EVAL
Pt's parent informed of normal results. Verbalized understanding, no further questions at this time.     LLE/weight-bearing as tolerated

## 2024-10-27 NOTE — PHYSICAL THERAPY INITIAL EVALUATION ADULT - PERTINENT HX OF CURRENT PROBLEM, REHAB EVAL
Pt is a  65 y/o female admitted to [facility] on 10/26/24 PMH of SLE/RA , chronic neutropenia/ thrombocytopenia x > 10 years ( Baseline PLT 90s as per daughter, seen Hematologist in the past not recently), Arthralgia with bilateral knee pain left  worse than right, left knee pain x 1 year and uses cane. Pt is now s/p L TKA  on 10/26/24.

## 2024-10-28 ENCOUNTER — TRANSCRIPTION ENCOUNTER (OUTPATIENT)
Age: 64
End: 2024-10-28

## 2024-10-28 LAB
ANION GAP SERPL CALC-SCNC: 13 MMOL/L — SIGNIFICANT CHANGE UP (ref 5–17)
BUN SERPL-MCNC: 11 MG/DL — SIGNIFICANT CHANGE UP (ref 7–23)
CALCIUM SERPL-MCNC: 9 MG/DL — SIGNIFICANT CHANGE UP (ref 8.4–10.5)
CHLORIDE SERPL-SCNC: 101 MMOL/L — SIGNIFICANT CHANGE UP (ref 96–108)
CO2 SERPL-SCNC: 22 MMOL/L — SIGNIFICANT CHANGE UP (ref 22–31)
CREAT SERPL-MCNC: 0.53 MG/DL — SIGNIFICANT CHANGE UP (ref 0.5–1.3)
EGFR: 103 ML/MIN/1.73M2 — SIGNIFICANT CHANGE UP
GLUCOSE SERPL-MCNC: 129 MG/DL — HIGH (ref 70–99)
HCT VFR BLD CALC: 32.7 % — LOW (ref 34.5–45)
HGB BLD-MCNC: 10.6 G/DL — LOW (ref 11.5–15.5)
MCHC RBC-ENTMCNC: 27.9 PG — SIGNIFICANT CHANGE UP (ref 27–34)
MCHC RBC-ENTMCNC: 32.4 GM/DL — SIGNIFICANT CHANGE UP (ref 32–36)
MCV RBC AUTO: 86.1 FL — SIGNIFICANT CHANGE UP (ref 80–100)
NRBC # BLD: 0 /100 WBCS — SIGNIFICANT CHANGE UP (ref 0–0)
PLATELET # BLD AUTO: 70 K/UL — LOW (ref 150–400)
POTASSIUM SERPL-MCNC: 3.5 MMOL/L — SIGNIFICANT CHANGE UP (ref 3.5–5.3)
POTASSIUM SERPL-SCNC: 3.5 MMOL/L — SIGNIFICANT CHANGE UP (ref 3.5–5.3)
RBC # BLD: 3.8 M/UL — SIGNIFICANT CHANGE UP (ref 3.8–5.2)
RBC # FLD: 14.6 % — HIGH (ref 10.3–14.5)
SODIUM SERPL-SCNC: 136 MMOL/L — SIGNIFICANT CHANGE UP (ref 135–145)
WBC # BLD: 6.6 K/UL — SIGNIFICANT CHANGE UP (ref 3.8–10.5)
WBC # FLD AUTO: 6.6 K/UL — SIGNIFICANT CHANGE UP (ref 3.8–10.5)

## 2024-10-28 RX ADMIN — Medication 200 MILLIGRAM(S): at 17:59

## 2024-10-28 RX ADMIN — Medication 1000 MILLIGRAM(S): at 22:05

## 2024-10-28 RX ADMIN — CYCLOBENZAPRINE HYDROCHLORIDE 5 MILLIGRAM(S): 30 CAPSULE, EXTENDED RELEASE ORAL at 11:05

## 2024-10-28 RX ADMIN — Medication 2 TABLET(S): at 21:05

## 2024-10-28 RX ADMIN — OXYCODONE HYDROCHLORIDE 10 MILLIGRAM(S): 30 TABLET ORAL at 04:56

## 2024-10-28 RX ADMIN — OXYCODONE HYDROCHLORIDE 10 MILLIGRAM(S): 30 TABLET ORAL at 03:56

## 2024-10-28 RX ADMIN — OXYCODONE HYDROCHLORIDE 10 MILLIGRAM(S): 30 TABLET ORAL at 13:01

## 2024-10-28 RX ADMIN — Medication 1000 MILLIGRAM(S): at 13:00

## 2024-10-28 RX ADMIN — POLYETHYLENE GLYCOL 3350 17 GRAM(S): 17 POWDER, FOR SOLUTION ORAL at 21:05

## 2024-10-28 RX ADMIN — LIDOCAINE HYDROCHLORIDE 1 PATCH: 40 SOLUTION TOPICAL at 23:13

## 2024-10-28 RX ADMIN — LIDOCAINE HYDROCHLORIDE 1 PATCH: 40 SOLUTION TOPICAL at 11:05

## 2024-10-28 RX ADMIN — CYCLOBENZAPRINE HYDROCHLORIDE 5 MILLIGRAM(S): 30 CAPSULE, EXTENDED RELEASE ORAL at 21:04

## 2024-10-28 RX ADMIN — Medication 81 MILLIGRAM(S): at 05:25

## 2024-10-28 RX ADMIN — PANTOPRAZOLE SODIUM 40 MILLIGRAM(S): 40 TABLET, DELAYED RELEASE ORAL at 05:24

## 2024-10-28 RX ADMIN — OXYCODONE HYDROCHLORIDE 10 MILLIGRAM(S): 30 TABLET ORAL at 16:16

## 2024-10-28 RX ADMIN — Medication 1000 MILLIGRAM(S): at 06:24

## 2024-10-28 RX ADMIN — OXYCODONE HYDROCHLORIDE 10 MILLIGRAM(S): 30 TABLET ORAL at 09:04

## 2024-10-28 RX ADMIN — Medication 1000 MILLIGRAM(S): at 07:27

## 2024-10-28 RX ADMIN — Medication 81 MILLIGRAM(S): at 17:59

## 2024-10-28 RX ADMIN — OXYCODONE HYDROCHLORIDE 10 MILLIGRAM(S): 30 TABLET ORAL at 14:00

## 2024-10-28 RX ADMIN — Medication 200 MILLIGRAM(S): at 05:24

## 2024-10-28 RX ADMIN — OXYCODONE HYDROCHLORIDE 10 MILLIGRAM(S): 30 TABLET ORAL at 10:04

## 2024-10-28 RX ADMIN — LIDOCAINE HYDROCHLORIDE 1 PATCH: 40 SOLUTION TOPICAL at 00:18

## 2024-10-28 RX ADMIN — Medication 5 MILLIGRAM(S): at 21:04

## 2024-10-28 RX ADMIN — Medication 1000 MILLIGRAM(S): at 05:25

## 2024-10-28 RX ADMIN — DULOXETINE HYDROCHLORIDE 30 MILLIGRAM(S): 30 CAPSULE, DELAYED RELEASE ORAL at 11:05

## 2024-10-28 RX ADMIN — Medication 1000 MILLIGRAM(S): at 21:05

## 2024-10-28 RX ADMIN — LIDOCAINE HYDROCHLORIDE 1 PATCH: 40 SOLUTION TOPICAL at 19:37

## 2024-10-28 RX ADMIN — OXYCODONE HYDROCHLORIDE 10 MILLIGRAM(S): 30 TABLET ORAL at 17:16

## 2024-10-28 RX ADMIN — Medication 1000 MILLIGRAM(S): at 14:00

## 2024-10-28 NOTE — DISCHARGE NOTE PROVIDER - CARE PROVIDER_API CALL
Leighton Pham  Orthopaedic Surgery  [address], [address]  [place] [address]  Phone: [phone]  Fax: [phone]  Follow Up Time: 2 weeks

## 2024-10-28 NOTE — DISCHARGE NOTE PROVIDER - NSDCCPTREATMENT_GEN_ALL_CORE_FT
PRINCIPAL PROCEDURE  Procedure: Arthroplasty, knee, total, robot-assisted, using Arsh system  Findings and Treatment: Left

## 2024-10-28 NOTE — DISCHARGE NOTE PROVIDER - NSDCFUADDINST_GEN_ALL_CORE_FT
- Please call to schedule your post-operative follow up appointment with Dr. Pham for 2 weeks after hospital discharge.  - Please follow instructions on discharge pamphlet provided.  - Keep dressing clean, dry, and intact. Have doctor remove bandage at your post-operative follow up appointment.  - Shower: with assistance beginning on POD #5. Keep the dressing away from the stream of water. Pat the dressing dry when coming out of the shower. No tub baths.  - Continue to ambulate as tolerated to the operative extremity with a rolling walker.  - Continue to take Ecotrin (Aspirin) 325mg twice daily x 6 weeks to help prevent blood clots. Please continue taking Protonix 40mg daily while taking Aspirin for gastrointestinal protection.  - Follow up with your primary care physician regarding your hospitalization and changes to medications within one month of your discharge.

## 2024-10-28 NOTE — PROVIDER CONTACT NOTE (OTHER) - ACTION/TREATMENT ORDERED:
As per KEVEN Delacruz, tachycardia is pain related. PRN pain medications administered as per orders. Will continue to monitor.

## 2024-10-28 NOTE — DISCHARGE NOTE PROVIDER - NSDCMRMEDTOKEN_GEN_ALL_CORE_FT
Cymbalta 30 mg oral delayed release capsule: 1 cap(s) orally once a day (at bedtime)  gabapentin:   hydroxychloroquine 200 mg oral tablet: 1 tab(s) orally 2 times a day  Tylenol 325 mg oral tablet: 2 tab(s) orally 1 to 3 times a day as needed for  moderate pain   Cymbalta 30 mg oral delayed release capsule: 1 cap(s) orally once a day (at bedtime)  hydroxychloroquine 200 mg oral tablet: 1 tab(s) orally 2 times a day  polyethylene glycol 3350 oral powder for reconstitution: 17 gram(s) orally once a day (at bedtime) as needed for  constipation (available over the counter)   acetaminophen 325 mg oral tablet: 3 tab(s) orally every 8 hours x 1 week post op, then as needed for mild pain  aspirin 81 mg oral delayed release tablet: 1 tab(s) orally every 12 hours x 6 weeks post op for DVT ppx (blood clot prevention)  cyclobenzaprine 5 mg oral tablet: 1 tab(s) orally 3 times a day as needed for  muscle spasm MDD: 003  Cymbalta 30 mg oral delayed release capsule: 1 cap(s) orally once a day (at bedtime)  gabapentin 300 mg oral capsule: 1 cap(s) orally once a day  hydroxychloroquine 200 mg oral tablet: 1 tab(s) orally 2 times a day  Narcan 4 mg/0.1 mL nasal spray: 1 spray(s) intranasally every 3 hours as needed for Opioid Overdose Alternate Nostrils every 3 minutes as needed  oxyCODONE 5 mg oral tablet: 1 tab(s) orally every 4 hours as needed for  moderate pain ; 2 tabs orally every 4 hours as needed for severe pain MDD: 006  pantoprazole 40 mg oral delayed release tablet: 1 tab(s) orally once a day before breakfast  polyethylene glycol 3350 oral powder for reconstitution: 17 gram(s) orally once a day (at bedtime) as needed for  constipation (available over the counter)  senna leaf extract oral tablet: 2 tab(s) orally once a day (at bedtime)  traMADol 50 mg oral tablet: 1 tab(s) orally every 6 hours as needed for  mild pain MDD: 004

## 2024-10-28 NOTE — PROGRESS NOTE ADULT - SUBJECTIVE AND OBJECTIVE BOX
Orthopedic Progress Note    Patient is status post left total knee arthroplasty, POD # 2  Patient seen and examined at bedside.  Patient's daughter (Davie) at bedside and translating for patient in Romansh.  Patient denies CP, SOB, dizziness, HA, N/V/D.  Patient reports pain controlled.    Vital Signs Last 24 Hrs  T(C): 36.9 (28 Oct 2024 04:47), Max: 37.2 (27 Oct 2024 16:39)  T(F): 98.4 (28 Oct 2024 04:47), Max: 99 (27 Oct 2024 16:39)  HR: 95 (28 Oct 2024 04:47) (77 - 95)  BP: 143/79 (28 Oct 2024 04:47) (111/71 - 143/79)  BP(mean): --  RR: 18 (28 Oct 2024 04:47) (17 - 18)  SpO2: 95% (28 Oct 2024 04:47) (95% - 98%)    Parameters below as of 28 Oct 2024 04:47  Patient On (Oxygen Delivery Method): room air    Exam:  Gen: No acute distress  LLE: Aquacel clean, dry, and intact  Motor intact EHL/FHL/TA/GS  SILT in the distal extremity  + DP pulse  BLE: Calves soft and nontender Orthopedic Progress Note    Patient is status post left total knee arthroplasty, POD # 2  Patient seen and examined at bedside.  Patient's daughter (Davie) at bedside and translating for patient in Lithuanian.  Patient denies CP, SOB, dizziness, HA, N/V/D.  Patient reports left knee pain this morning.    Vital Signs Last 24 Hrs  T(C): 36.9 (28 Oct 2024 04:47), Max: 37.2 (27 Oct 2024 16:39)  T(F): 98.4 (28 Oct 2024 04:47), Max: 99 (27 Oct 2024 16:39)  HR: 95 (28 Oct 2024 04:47) (77 - 95)  BP: 143/79 (28 Oct 2024 04:47) (111/71 - 143/79)  BP(mean): --  RR: 18 (28 Oct 2024 04:47) (17 - 18)  SpO2: 95% (28 Oct 2024 04:47) (95% - 98%)    Parameters below as of 28 Oct 2024 04:47  Patient On (Oxygen Delivery Method): room air    Exam:  Gen: No acute distress  LLE: Aquacel clean, dry, and intact  Motor intact EHL/FHL/TA/GS  SILT in the distal extremity  + DP pulse  BLE: Calves soft and nontender

## 2024-10-28 NOTE — DISCHARGE NOTE PROVIDER - HOSPITAL COURSE
History of Present Illness:  64 year old Khmer speaking female with PMH of SLE/RA , chronic neutropenia/ thrombocytopenia x > 10 years ( Baseline PLT 90s as per daughter, seen Hematologist in the past not recently), Arthralgia with bilateral knee pain left  worse than right, left knee pain x 1 year uses cane  worsening planned for Left total knee arthroplasty with mauro Robot on 10/26/2024    Goal of Care: I want to walk without pain    Past Medical History:  2019 novel coronavirus disease (COVID-19)   E. coli UTI (urinary tract infection)   H/O fibromyalgia   H/O neutropenia   H/O rheumatoid arthritis   H/O splenomegaly   H/O thrombocytopenia   Language barrier to communication   Primary osteoarthritis of left knee   Systemic lupus erythematosus    Past Surgical History:  S/P left cataract extraction    Hospital Course:  After admission on 10/26/24 and receiving pre-operative parenteral prophylactic antibiotics, the patient underwent a left total knee arthroplasty with Dr. Pham. Patient tolerated the procedure well and was transferred to the recovery room in stable condition, with a stable neuro/vascular exam of the operated extremity.    Patient was placed on Aspirin for DVT ppx, and was placed on Protonix for GI protection.    Patient was made weight bearing as tolerated left lower extremity.    Patient evaluated by PT/OT and recommended for disposition for home with home PT.    Discharge and orthopedic care instructions were delineated in the discharge plan and reviewed with the patient. All medications were delineated in the medication reconciliation tool and key points were reviewed with the patient. They were deemed stable from an orthopedic & medical standpoint for discharge.

## 2024-10-28 NOTE — DISCHARGE NOTE PROVIDER - NSDCHHPTASSISTHOME_GEN_ALL_CORE
well developed, well nourished , in no acute distress , ambulating without difficulty , normal communication ability
Patient Needs Assistance to Leave Residence...

## 2024-10-29 RX ORDER — TRAMADOL HYDROCHLORIDE 50 MG/1
1 TABLET, COATED ORAL
Qty: 28 | Refills: 0
Start: 2024-10-29 | End: 2024-11-04

## 2024-10-29 RX ORDER — ACETAMINOPHEN 500 MG
3 TABLET ORAL
Qty: 63 | Refills: 0
Start: 2024-10-29 | End: 2024-11-04

## 2024-10-29 RX ORDER — GABAPENTIN 300 MG/1
0 CAPSULE ORAL
Refills: 0 | DISCHARGE

## 2024-10-29 RX ORDER — CYCLOBENZAPRINE HYDROCHLORIDE 30 MG/1
1 CAPSULE, EXTENDED RELEASE ORAL
Qty: 42 | Refills: 0
Start: 2024-10-29 | End: 2024-11-11

## 2024-10-29 RX ORDER — POLYETHYLENE GLYCOL 3350 17 G/17G
17 POWDER, FOR SOLUTION ORAL
Qty: 0 | Refills: 0 | DISCHARGE
Start: 2024-10-29

## 2024-10-29 RX ORDER — ASPIRIN/MAG CARB/ALUMINUM AMIN 325 MG
1 TABLET ORAL
Qty: 84 | Refills: 0
Start: 2024-10-29 | End: 2024-12-09

## 2024-10-29 RX ORDER — PANTOPRAZOLE SODIUM 40 MG/1
1 TABLET, DELAYED RELEASE ORAL
Qty: 30 | Refills: 0
Start: 2024-10-29 | End: 2024-11-27

## 2024-10-29 RX ORDER — PANTOPRAZOLE SODIUM 40 MG/1
1 TABLET, DELAYED RELEASE ORAL
Qty: 42 | Refills: 0
Start: 2024-10-29 | End: 2024-12-09

## 2024-10-29 RX ORDER — OXYCODONE HYDROCHLORIDE 30 MG/1
1 TABLET ORAL
Qty: 42 | Refills: 0
Start: 2024-10-29 | End: 2024-11-04

## 2024-10-29 RX ORDER — SENNA 187 MG
2 TABLET ORAL
Qty: 14 | Refills: 0
Start: 2024-10-29 | End: 2024-11-04

## 2024-10-29 RX ORDER — NALOXONE HYDROCHLORIDE 1 MG/ML
1 INJECTION, SOLUTION INTRAMUSCULAR; INTRAVENOUS; SUBCUTANEOUS
Qty: 1 | Refills: 0
Start: 2024-10-29 | End: 2024-11-04

## 2024-10-29 RX ORDER — NALOXONE HYDROCHLORIDE 1 MG/ML
1 INJECTION, SOLUTION INTRAMUSCULAR; INTRAVENOUS; SUBCUTANEOUS
Qty: 1 | Refills: 0
Start: 2024-10-29

## 2024-10-29 RX ORDER — GABAPENTIN 300 MG/1
1 CAPSULE ORAL
Refills: 0 | DISCHARGE

## 2024-10-29 RX ORDER — ACETAMINOPHEN 325 MG
2 TABLET ORAL
Refills: 0 | DISCHARGE

## 2024-10-29 RX ADMIN — LIDOCAINE HYDROCHLORIDE 1 PATCH: 40 SOLUTION TOPICAL at 23:16

## 2024-10-29 RX ADMIN — OXYCODONE HYDROCHLORIDE 10 MILLIGRAM(S): 30 TABLET ORAL at 18:26

## 2024-10-29 RX ADMIN — OXYCODONE HYDROCHLORIDE 10 MILLIGRAM(S): 30 TABLET ORAL at 10:07

## 2024-10-29 RX ADMIN — Medication 1000 MILLIGRAM(S): at 14:14

## 2024-10-29 RX ADMIN — Medication 81 MILLIGRAM(S): at 17:24

## 2024-10-29 RX ADMIN — OXYCODONE HYDROCHLORIDE 10 MILLIGRAM(S): 30 TABLET ORAL at 17:26

## 2024-10-29 RX ADMIN — OXYCODONE HYDROCHLORIDE 10 MILLIGRAM(S): 30 TABLET ORAL at 02:31

## 2024-10-29 RX ADMIN — Medication 1000 MILLIGRAM(S): at 06:27

## 2024-10-29 RX ADMIN — DULOXETINE HYDROCHLORIDE 30 MILLIGRAM(S): 30 CAPSULE, DELAYED RELEASE ORAL at 11:30

## 2024-10-29 RX ADMIN — Medication 1000 MILLIGRAM(S): at 23:00

## 2024-10-29 RX ADMIN — Medication 81 MILLIGRAM(S): at 06:27

## 2024-10-29 RX ADMIN — Medication 200 MILLIGRAM(S): at 17:24

## 2024-10-29 RX ADMIN — LIDOCAINE HYDROCHLORIDE 1 PATCH: 40 SOLUTION TOPICAL at 11:30

## 2024-10-29 RX ADMIN — OXYCODONE HYDROCHLORIDE 10 MILLIGRAM(S): 30 TABLET ORAL at 22:00

## 2024-10-29 RX ADMIN — Medication 1000 MILLIGRAM(S): at 21:59

## 2024-10-29 RX ADMIN — POLYETHYLENE GLYCOL 3350 17 GRAM(S): 17 POWDER, FOR SOLUTION ORAL at 22:00

## 2024-10-29 RX ADMIN — CYCLOBENZAPRINE HYDROCHLORIDE 5 MILLIGRAM(S): 30 CAPSULE, EXTENDED RELEASE ORAL at 06:28

## 2024-10-29 RX ADMIN — OXYCODONE HYDROCHLORIDE 10 MILLIGRAM(S): 30 TABLET ORAL at 23:00

## 2024-10-29 RX ADMIN — Medication 1000 MILLIGRAM(S): at 13:14

## 2024-10-29 RX ADMIN — Medication 5 MILLIGRAM(S): at 22:00

## 2024-10-29 RX ADMIN — Medication 200 MILLIGRAM(S): at 06:28

## 2024-10-29 RX ADMIN — LIDOCAINE HYDROCHLORIDE 1 PATCH: 40 SOLUTION TOPICAL at 19:15

## 2024-10-29 RX ADMIN — Medication 2 TABLET(S): at 22:00

## 2024-10-29 RX ADMIN — PANTOPRAZOLE SODIUM 40 MILLIGRAM(S): 40 TABLET, DELAYED RELEASE ORAL at 06:27

## 2024-10-29 RX ADMIN — OXYCODONE HYDROCHLORIDE 10 MILLIGRAM(S): 30 TABLET ORAL at 03:31

## 2024-10-29 RX ADMIN — OXYCODONE HYDROCHLORIDE 10 MILLIGRAM(S): 30 TABLET ORAL at 11:07

## 2024-10-30 ENCOUNTER — TRANSCRIPTION ENCOUNTER (OUTPATIENT)
Age: 64
End: 2024-10-30

## 2024-10-30 VITALS
DIASTOLIC BLOOD PRESSURE: 78 MMHG | TEMPERATURE: 98 F | HEART RATE: 79 BPM | OXYGEN SATURATION: 96 % | RESPIRATION RATE: 18 BRPM | SYSTOLIC BLOOD PRESSURE: 112 MMHG

## 2024-10-30 PROCEDURE — C1776: CPT

## 2024-10-30 PROCEDURE — C1713: CPT

## 2024-10-30 PROCEDURE — 73560 X-RAY EXAM OF KNEE 1 OR 2: CPT

## 2024-10-30 PROCEDURE — 97165 OT EVAL LOW COMPLEX 30 MIN: CPT

## 2024-10-30 PROCEDURE — 97161 PT EVAL LOW COMPLEX 20 MIN: CPT

## 2024-10-30 PROCEDURE — 97116 GAIT TRAINING THERAPY: CPT

## 2024-10-30 PROCEDURE — 97110 THERAPEUTIC EXERCISES: CPT

## 2024-10-30 PROCEDURE — 97530 THERAPEUTIC ACTIVITIES: CPT

## 2024-10-30 PROCEDURE — 85027 COMPLETE CBC AUTOMATED: CPT

## 2024-10-30 PROCEDURE — 97535 SELF CARE MNGMENT TRAINING: CPT

## 2024-10-30 PROCEDURE — 82962 GLUCOSE BLOOD TEST: CPT

## 2024-10-30 PROCEDURE — C9399: CPT

## 2024-10-30 PROCEDURE — 80048 BASIC METABOLIC PNL TOTAL CA: CPT

## 2024-10-30 PROCEDURE — S2900: CPT

## 2024-10-30 RX ADMIN — CYCLOBENZAPRINE HYDROCHLORIDE 5 MILLIGRAM(S): 30 CAPSULE, EXTENDED RELEASE ORAL at 10:51

## 2024-10-30 RX ADMIN — PANTOPRAZOLE SODIUM 40 MILLIGRAM(S): 40 TABLET, DELAYED RELEASE ORAL at 05:46

## 2024-10-30 RX ADMIN — Medication 1000 MILLIGRAM(S): at 05:46

## 2024-10-30 RX ADMIN — Medication 200 MILLIGRAM(S): at 05:46

## 2024-10-30 RX ADMIN — OXYCODONE HYDROCHLORIDE 10 MILLIGRAM(S): 30 TABLET ORAL at 12:30

## 2024-10-30 RX ADMIN — OXYCODONE HYDROCHLORIDE 10 MILLIGRAM(S): 30 TABLET ORAL at 05:46

## 2024-10-30 RX ADMIN — Medication 81 MILLIGRAM(S): at 05:46

## 2024-10-30 NOTE — PROGRESS NOTE ADULT - SUBJECTIVE AND OBJECTIVE BOX
Orthopedic Progress Note    Patient is status post left total knee arthroplasty, POD # 4  Patient seen and examined at bedside.  Patient's daughter (Davie) at bedside and translating for patient in Armenian.  Patient denies CP, SOB, dizziness, HA, N/V/D.  Patient reports pain controlled.    Vital Signs Last 24 Hrs  T(C): 37 (30 Oct 2024 04:00), Max: 37.2 (30 Oct 2024 00:00)  T(F): 98.6 (30 Oct 2024 04:00), Max: 98.9 (30 Oct 2024 00:00)  HR: 88 (30 Oct 2024 04:00) (87 - 110)  BP: 110/59 (30 Oct 2024 04:00) (101/67 - 128/92)  BP(mean): --  RR: 18 (30 Oct 2024 04:00) (18 - 18)  SpO2: 96% (30 Oct 2024 04:00) (96% - 98%)    Parameters below as of 30 Oct 2024 04:00  Patient On (Oxygen Delivery Method): room air    Exam:  Gen: No acute distress  LLE: Aquacel clean, dry, and intact  Motor intact EHL/FHL/TA/GS  SILT in the distal extremity  + DP pulse  BLE: Calves soft and nontender

## 2024-10-30 NOTE — DISCHARGE NOTE NURSING/CASE MANAGEMENT/SOCIAL WORK - NSDCPEFALRISK_GEN_ALL_CORE
For information on Fall & Injury Prevention, visit: [email or web address] OR  [email or web address] OR  https://www.cdc.gov/steadi/patient.html

## 2024-10-30 NOTE — DISCHARGE NOTE NURSING/CASE MANAGEMENT/SOCIAL WORK - PATIENT PORTAL LINK FT
You can access the FollowMyHealth Patient Portal offered by [facility] by registering at the following website: [email or web address]. By joining [facility]’s FollowMyHealth portal, you will also be able to view your health information using other applications (apps) compatible with our system.

## 2024-10-30 NOTE — DISCHARGE NOTE NURSING/CASE MANAGEMENT/SOCIAL WORK - FINANCIAL ASSISTANCE
[facility] provides services at a reduced cost to those who are determined to be eligible through [facility]’s financial assistance program. Information regarding [facility]’s financial assistance program can be found by going to [email or web address] or by calling [phone].

## 2024-10-30 NOTE — PROGRESS NOTE ADULT - ASSESSMENT
A/P: 63 y/o female s/p left total knee arthroplasty, POD # 2    - Pain control/analgesia  - DVT ppx: Aspirin, SCDs  - PT/OT   - WBAT LLE  - OOB  - Incentive spirometer  - GI ppx  - Bowel regimen  - Follow up AM labs  - Notify ortho for questions  - Dispo: PT recommended home with home PT    Qiana García PA-C  Orthopedic Surgery Team  Team Pager #[phone]
A/P: 65 y/o female s/p left total knee arthroplasty, POD # 3    - Pain control/analgesia  - DVT ppx: Aspirin, SCDs  - PT/OT   - WBAT LLE  - OOB  - Incentive spirometer  - GI ppx  - Bowel regimen  - Follow up AM labs  - Notify ortho for questions  - Dispo: PT recommended home with home PT      Team Pager #[phone]
A/p: 64y Female POD#1 s/p L Total Knee Arthroplasty.  VSS. NAD.    PT/OT-WBAT LLE  IS  DVT PPx: ASA 81mg PO BID, SCDs, early OOB and ambulation.  Pain Control  Continue Current Tx.  Dispo planning pending PT recommendations    Naif Santillan PA-C  Orthopedic Surgery Team  Team Pager: [phone]
A/P: 65 y/o female s/p left total knee arthroplasty, POD # 4    - Pain control/analgesia  - DVT ppx: Aspirin, SCDs  - PT/OT   - WBAT LLE  - OOB  - Incentive spirometer  - GI ppx  - Bowel regimen  - Follow up AM labs  - Notify ortho for questions  - Dispo: PT recommended home with home PT, PT cleared    Qiana García PA-C  Orthopedic Surgery Team  Team Pager #[phone]

## 2024-11-04 RX ORDER — PANTOPRAZOLE SODIUM 40 MG/1
1 TABLET, DELAYED RELEASE ORAL
Qty: 14 | Refills: 0
Start: 2024-11-04 | End: 2024-11-17

## 2024-11-04 NOTE — CHART NOTE - NSCHARTNOTEFT_GEN_A_CORE
POST OP CHECK      Resting without complaints.  No Chest Pain, SOB, N/V.    T(C): 36.4 (10-26-24 @ 09:00), Max: 36.4 (10-26-24 @ 09:00)  HR: 74 (10-26-24 @ 15:30) (69 - 82)  BP: 127/62 (10-26-24 @ 15:30) (125/84 - 143/68)  RR: 16 (10-26-24 @ 15:30) (16 - 16)  SpO2: 96% (10-26-24 @ 15:30) (96% - 100%)    Exam:  Alert and Vincent, No Acute Distress  Card: +S1/S2, RRR  Pulm: CTAB  Laterality:Left L/E, n/v intact, moving digits Aquacel  +PF/DF/EHL/FHL  + DP pulses    Xray:Hardware in good alignment      A/P: S/p Total Left knee Arthroplasty(DARCY). VSS. NAD  -PT/OT-WBAT  -IS  -DVT PPx  -Pain Control  -Continue Current Tx  -Dispo planning    Gale Schroeder PA-C  Orthopaedic Surgery  Team pager [phone]  [facility] [phone]  [phone]
Post-Discharge Medication Review: Completed	  	  Caregiver (Mally, grandchildren) contacted to offer medication counseling post-discharge. Medication reconciliation completed. Per Caregiver, medications include:	  	  1.	acetaminophen 325 mg oral tablet 3 tab(s) orally every 8 hours x 1 week post op, then as needed for mild pain  2.	aspirin 81 mg oral delayed release tablet 1 tab(s) orally every 12 hours x 6 weeks post op for DVT ppx (blood clot prevention)  3.	cyclobenzaprine 5 mg oral tablet 1 tab(s) orally 3 times a day as needed for muscle spasm MDD: 003  4.	Cymbalta 30 mg oral delayed release capsule 1 cap(s) orally once a day (at bedtime)  5.	gabapentin 300 mg oral capsule 1 cap(s) orally once a day  6.	hydroxychloroquine 200 mg oral tablet 1 tab(s) orally 2 times a day  7.	traMADol 50 mg oral tablet 1 tab(s) orally every 6 hours as needed for mild pain MDD: 004  8.	Narcan 4 mg/0.1 mL nasal spray 1 spray(s) intranasally every 3 hours as needed for Opioid Overdose Alternate Nostrils every 3 minutes as needed  9.	oxyCODONE 5 mg oral tablet 1 tab(s) orally every 4 hours as needed for moderate pain ; 2 tabs orally every 4 hours as needed for severe pain MDD: 006  10.	pantoprazole 40 mg oral delayed release tablet 1 tab(s) orally once a day before breakfast  11.	polyethylene glycol 3350 oral powder for reconstitution 17 gram(s) orally once a day (at bedtime) as needed for constipation (available over the counter)  12.	senna leaf extract oral tablet 2 tab(s) orally once a day (at bedtime)   	  	  	  	  Medication name, indication, administration, side effects, and monitoring reviewed for new medications during post discharge counseling visit with Caregiver. Caregiver demonstrated understanding. Counseling offered for all medications.	  	  Inpatient team confirmed dose of asprin as 81 mg BID for DVT prophylaxis. Patient was discharge with 30 days supply of pantoprazole while on asprin therapy.  Inpatient team sent additional supply of pantoprazole to patient's preferred pharmacy to complete 6 week course of pantoprazole while on aspirin therapy. 	    	  Sergei Sanchez, PharmD	  Clinical Pharmacy Specialist, Pharmacy Telehealth Team	  Can be reached via [place] Teams or [phone]

## 2024-11-05 PROBLEM — N39.0 URINARY TRACT INFECTION, SITE NOT SPECIFIED: Chronic | Status: ACTIVE | Noted: 2024-10-08

## 2024-11-05 PROBLEM — Z87.898 PERSONAL HISTORY OF OTHER SPECIFIED CONDITIONS: Chronic | Status: ACTIVE | Noted: 2024-10-08

## 2024-11-05 PROBLEM — Z87.39 PERSONAL HISTORY OF OTHER DISEASES OF THE MUSCULOSKELETAL SYSTEM AND CONNECTIVE TISSUE: Chronic | Status: ACTIVE | Noted: 2024-10-08

## 2024-11-05 PROBLEM — Z78.9 OTHER SPECIFIED HEALTH STATUS: Chronic | Status: ACTIVE | Noted: 2024-10-08

## 2024-11-05 PROBLEM — U07.1 COVID-19: Chronic | Status: ACTIVE | Noted: 2024-10-08

## 2024-11-05 PROBLEM — M17.12 UNILATERAL PRIMARY OSTEOARTHRITIS, LEFT KNEE: Chronic | Status: ACTIVE | Noted: 2024-10-08

## 2024-11-05 PROBLEM — Z86.2 PERSONAL HISTORY OF DISEASES OF THE BLOOD AND BLOOD-FORMING ORGANS AND CERTAIN DISORDERS INVOLVING THE IMMUNE MECHANISM: Chronic | Status: ACTIVE | Noted: 2024-10-08

## 2024-11-14 ENCOUNTER — APPOINTMENT (OUTPATIENT)
Dept: ORTHOPEDIC SURGERY | Facility: CLINIC | Age: 64
End: 2024-11-14
Payer: MEDICARE

## 2024-11-14 VITALS — WEIGHT: 185 LBS | BODY MASS INDEX: 34.04 KG/M2 | HEIGHT: 62 IN

## 2024-11-14 DIAGNOSIS — Z96.652 PRESENCE OF LEFT ARTIFICIAL KNEE JOINT: ICD-10-CM

## 2024-11-14 PROCEDURE — 99024 POSTOP FOLLOW-UP VISIT: CPT

## 2024-11-14 PROCEDURE — 73562 X-RAY EXAM OF KNEE 3: CPT | Mod: LT

## 2024-12-06 ENCOUNTER — OUTPATIENT (OUTPATIENT)
Dept: OUTPATIENT SERVICES | Facility: HOSPITAL | Age: 64
LOS: 1 days | End: 2024-12-06
Payer: COMMERCIAL

## 2024-12-06 ENCOUNTER — APPOINTMENT (OUTPATIENT)
Dept: RHEUMATOLOGY | Facility: HOSPITAL | Age: 64
End: 2024-12-06

## 2024-12-06 VITALS
DIASTOLIC BLOOD PRESSURE: 82 MMHG | RESPIRATION RATE: 16 BRPM | HEIGHT: 65 IN | HEART RATE: 92 BPM | BODY MASS INDEX: 31.65 KG/M2 | WEIGHT: 190 LBS | OXYGEN SATURATION: 96 % | SYSTOLIC BLOOD PRESSURE: 131 MMHG

## 2024-12-06 DIAGNOSIS — Z98.42 CATARACT EXTRACTION STATUS, LEFT EYE: Chronic | ICD-10-CM

## 2024-12-06 DIAGNOSIS — M25.50 PAIN IN UNSPECIFIED JOINT: ICD-10-CM

## 2024-12-06 DIAGNOSIS — M06.9 RHEUMATOID ARTHRITIS, UNSPECIFIED: ICD-10-CM

## 2024-12-06 DIAGNOSIS — Z00.00 ENCOUNTER FOR GENERAL ADULT MEDICAL EXAMINATION W/OUT ABNORMAL FINDINGS: ICD-10-CM

## 2024-12-06 LAB
ALBUMIN SERPL ELPH-MCNC: 4.3 G/DL — SIGNIFICANT CHANGE UP (ref 3.3–5)
ALP SERPL-CCNC: 99 U/L — SIGNIFICANT CHANGE UP (ref 40–120)
ALT FLD-CCNC: 6 U/L — LOW (ref 10–45)
ANION GAP SERPL CALC-SCNC: 14 MMOL/L — SIGNIFICANT CHANGE UP (ref 5–17)
AST SERPL-CCNC: 13 U/L — SIGNIFICANT CHANGE UP (ref 10–40)
BILIRUB SERPL-MCNC: 0.2 MG/DL — SIGNIFICANT CHANGE UP (ref 0.2–1.2)
BUN SERPL-MCNC: 12 MG/DL — SIGNIFICANT CHANGE UP (ref 7–23)
CALCIUM SERPL-MCNC: 9.6 MG/DL — SIGNIFICANT CHANGE UP (ref 8.4–10.5)
CHLORIDE SERPL-SCNC: 103 MMOL/L — SIGNIFICANT CHANGE UP (ref 96–108)
CO2 SERPL-SCNC: 24 MMOL/L — SIGNIFICANT CHANGE UP (ref 22–31)
CREAT SERPL-MCNC: 0.56 MG/DL — SIGNIFICANT CHANGE UP (ref 0.5–1.3)
EGFR: 101 ML/MIN/1.73M2 — SIGNIFICANT CHANGE UP
FERRITIN SERPL-MCNC: 31 NG/ML — SIGNIFICANT CHANGE UP (ref 13–330)
GLUCOSE SERPL-MCNC: 87 MG/DL — SIGNIFICANT CHANGE UP (ref 70–99)
HCT VFR BLD CALC: 36 % — SIGNIFICANT CHANGE UP (ref 34.5–45)
HGB BLD-MCNC: 11.6 G/DL — SIGNIFICANT CHANGE UP (ref 11.5–15.5)
IRON SATN MFR SERPL: 11 % — LOW (ref 14–50)
IRON SATN MFR SERPL: 37 UG/DL — SIGNIFICANT CHANGE UP (ref 30–160)
MCHC RBC-ENTMCNC: 26.4 PG — LOW (ref 27–34)
MCHC RBC-ENTMCNC: 32.2 G/DL — SIGNIFICANT CHANGE UP (ref 32–36)
MCV RBC AUTO: 81.8 FL — SIGNIFICANT CHANGE UP (ref 80–100)
PLATELET # BLD AUTO: 89 K/UL — LOW (ref 150–400)
POTASSIUM SERPL-MCNC: 4.2 MMOL/L — SIGNIFICANT CHANGE UP (ref 3.5–5.3)
POTASSIUM SERPL-SCNC: 4.2 MMOL/L — SIGNIFICANT CHANGE UP (ref 3.5–5.3)
PROT SERPL-MCNC: 7.8 G/DL — SIGNIFICANT CHANGE UP (ref 6–8.3)
RBC # BLD: 4.4 M/UL — SIGNIFICANT CHANGE UP (ref 3.8–5.2)
RBC # FLD: 14.7 % — HIGH (ref 10.3–14.5)
SODIUM SERPL-SCNC: 141 MMOL/L — SIGNIFICANT CHANGE UP (ref 135–145)
TIBC SERPL-MCNC: 330 UG/DL — SIGNIFICANT CHANGE UP (ref 220–430)
UIBC SERPL-MCNC: 293 UG/DL — SIGNIFICANT CHANGE UP (ref 110–370)
WBC # BLD: 3.27 K/UL — LOW (ref 3.8–10.5)
WBC # FLD AUTO: 3.27 K/UL — LOW (ref 3.8–10.5)

## 2024-12-06 PROCEDURE — 36415 COLL VENOUS BLD VENIPUNCTURE: CPT

## 2024-12-06 PROCEDURE — 83540 ASSAY OF IRON: CPT

## 2024-12-06 PROCEDURE — 80053 COMPREHEN METABOLIC PANEL: CPT

## 2024-12-06 PROCEDURE — 83550 IRON BINDING TEST: CPT

## 2024-12-06 PROCEDURE — G0463: CPT

## 2024-12-06 PROCEDURE — 85027 COMPLETE CBC AUTOMATED: CPT

## 2024-12-06 PROCEDURE — 82728 ASSAY OF FERRITIN: CPT

## 2024-12-06 PROCEDURE — 99214 OFFICE O/P EST MOD 30 MIN: CPT | Mod: GC

## 2024-12-06 RX ORDER — GABAPENTIN 100 MG/1
100 CAPSULE ORAL
Refills: 0 | Status: ACTIVE | COMMUNITY
Start: 2024-12-06

## 2024-12-16 DIAGNOSIS — M25.50 PAIN IN UNSPECIFIED JOINT: ICD-10-CM

## 2024-12-18 ENCOUNTER — APPOINTMENT (OUTPATIENT)
Dept: ULTRASOUND IMAGING | Facility: CLINIC | Age: 64
End: 2024-12-18
Payer: MEDICARE

## 2024-12-18 PROCEDURE — 76700 US EXAM ABDOM COMPLETE: CPT

## 2024-12-24 ENCOUNTER — APPOINTMENT (OUTPATIENT)
Dept: RADIOLOGY | Facility: CLINIC | Age: 64
End: 2024-12-24

## 2025-01-09 ENCOUNTER — APPOINTMENT (OUTPATIENT)
Dept: RADIOLOGY | Facility: CLINIC | Age: 65
End: 2025-01-09
Payer: MEDICARE

## 2025-01-09 PROCEDURE — 77080 DXA BONE DENSITY AXIAL: CPT

## 2025-01-10 ENCOUNTER — NON-APPOINTMENT (OUTPATIENT)
Age: 65
End: 2025-01-10

## 2025-01-10 DIAGNOSIS — M81.0 AGE-RELATED OSTEOPOROSIS W/OUT CURRENT PATHOLOGICAL FRACTURE: ICD-10-CM

## 2025-01-10 RX ORDER — CALCIUM CARBONATE/VITAMIN D3 600 MG-10
600-10 TABLET ORAL
Qty: 90 | Refills: 0 | Status: ACTIVE | COMMUNITY
Start: 2025-01-10 | End: 1900-01-01

## 2025-01-10 RX ORDER — ALENDRONATE SODIUM 70 MG/1
70 TABLET ORAL
Qty: 16 | Refills: 1 | Status: ACTIVE | COMMUNITY
Start: 2025-01-10 | End: 1900-01-01

## (undated) DEVICE — VENODYNE/SCD SLEEVE CALF MEDIUM

## (undated) DEVICE — POSITIONER FOAM HEADREST (PINK)

## (undated) DEVICE — WARMING BLANKET UPPER ADULT

## (undated) DEVICE — SOL IRR POUR H2O 1000ML

## (undated) DEVICE — MAKO BLADE STANDARD

## (undated) DEVICE — HOOD T7 NON-PEELAWAY

## (undated) DEVICE — SUT PDO 2 1/2 CIRCLE 40MM NDL 45CM

## (undated) DEVICE — PACK TOTAL KNEE (2 PACKS)

## (undated) DEVICE — ELCTR PLASMA BLADE X 3.0S WIDE TIP

## (undated) DEVICE — POSITIONER CARDIAC BUMP

## (undated) DEVICE — DRSG AQUACEL 3.5 X 10"

## (undated) DEVICE — NDL HYPO SAFE 22G X 1.5" (BLACK)

## (undated) DEVICE — SUT POLYSORB 1 36" GS-21 UNDYED

## (undated) DEVICE — TOURNIQUET CUFF 34" DUAL PORT W PLC

## (undated) DEVICE — SUT QUILL PDO 0 45CM 36MM

## (undated) DEVICE — POSITIONER FOAM EGG CRATE ULNAR 2PCS (PINK)

## (undated) DEVICE — SPECIMEN CONTAINER 100ML

## (undated) DEVICE — SUT POLYSORB 2-0 30" GS-21 UNDYED

## (undated) DEVICE — DRAPE 3/4 SHEET W REINFORCEMENT 56X77"

## (undated) DEVICE — TUBING SUCTION 20FT

## (undated) DEVICE — GLV 8 PROTEXIS (WHITE)

## (undated) DEVICE — SUT QUILL MONODERM 2-0 3/8 CIRCLE 45CM

## (undated) DEVICE — DRSG DERMABOND 0.7ML

## (undated) DEVICE — SAW BLADE STRYKER SAGITTAL DUAL CUT 64X35X.89MM

## (undated) DEVICE — GLV 8.5 PROTEXIS (WHITE)

## (undated) DEVICE — SOL IRR POUR NS 0.9% 500ML

## (undated) DEVICE — MAKO VIZADISC KNEE TRACKING KIT

## (undated) DEVICE — MAKO DRAPE KIT

## (undated) DEVICE — WOUND IRR IRRISEPT W 0.5 CHG

## (undated) DEVICE — SYR LUER LOK 20CC